# Patient Record
Sex: FEMALE | Race: ASIAN | NOT HISPANIC OR LATINO | Employment: FULL TIME | URBAN - METROPOLITAN AREA
[De-identification: names, ages, dates, MRNs, and addresses within clinical notes are randomized per-mention and may not be internally consistent; named-entity substitution may affect disease eponyms.]

---

## 2021-03-29 ENCOUNTER — OFFICE VISIT (OUTPATIENT)
Dept: FAMILY MEDICINE CLINIC | Facility: CLINIC | Age: 51
End: 2021-03-29
Payer: COMMERCIAL

## 2021-03-29 VITALS
BODY MASS INDEX: 26.11 KG/M2 | TEMPERATURE: 97.8 F | HEIGHT: 60 IN | HEART RATE: 74 BPM | SYSTOLIC BLOOD PRESSURE: 116 MMHG | OXYGEN SATURATION: 97 % | DIASTOLIC BLOOD PRESSURE: 74 MMHG | WEIGHT: 133 LBS

## 2021-03-29 DIAGNOSIS — Z76.89 ENCOUNTER TO ESTABLISH CARE: Primary | ICD-10-CM

## 2021-03-29 DIAGNOSIS — I10 ESSENTIAL HYPERTENSION: ICD-10-CM

## 2021-03-29 PROCEDURE — 99203 OFFICE O/P NEW LOW 30 MIN: CPT | Performed by: NURSE PRACTITIONER

## 2021-03-29 RX ORDER — CHLORHEXIDINE GLUCONATE 0.12 MG/ML
RINSE ORAL
COMMUNITY
Start: 2021-02-11 | End: 2021-03-29 | Stop reason: ALTCHOICE

## 2021-03-29 RX ORDER — IBUPROFEN 600 MG/1
600 TABLET ORAL EVERY 6 HOURS PRN
COMMUNITY
Start: 2021-02-11 | End: 2021-03-29 | Stop reason: ALTCHOICE

## 2021-03-29 RX ORDER — AMLODIPINE BESYLATE AND BENAZEPRIL HYDROCHLORIDE 10; 40 MG/1; MG/1
CAPSULE ORAL
COMMUNITY
Start: 2021-03-28 | End: 2021-09-17 | Stop reason: SDUPTHER

## 2021-03-29 RX ORDER — METOPROLOL SUCCINATE 50 MG/1
TABLET, EXTENDED RELEASE ORAL
COMMUNITY
End: 2021-03-29 | Stop reason: ALTCHOICE

## 2021-03-29 RX ORDER — NORETHINDRONE ACETATE AND ETHINYL ESTRADIOL .03; 1.5 MG/1; MG/1
TABLET ORAL
COMMUNITY
End: 2021-03-29 | Stop reason: ALTCHOICE

## 2021-03-29 RX ORDER — HYDROCODONE BITARTRATE AND ACETAMINOPHEN 5; 325 MG/1; MG/1
1 TABLET ORAL EVERY 6 HOURS PRN
COMMUNITY
Start: 2021-02-11 | End: 2021-03-29 | Stop reason: ALTCHOICE

## 2021-03-29 NOTE — ASSESSMENT & PLAN NOTE
Very well-controlled, continue lotrel  Continue f/u with nephrology for proteinuria  Will obtain recent lab records from [facility]

## 2021-03-29 NOTE — PROGRESS NOTES
BMI Counseling: Body mass index is 25 97 kg/m²  The BMI is above normal  Nutrition recommendations include decreasing portion sizes, encouraging healthy choices of fruits and vegetables, decreasing fast food intake, consuming healthier snacks and limiting drinks that contain sugar  Exercise recommendations include moderate physical activity 150 minutes/week, vigorous physical activity 75 minutes/week and exercising 3-5 times per week  No pharmacotherapy was ordered  Assessment/Plan:     Chronic Problems:  Essential hypertension  Very well-controlled, continue lotrel  Continue f/u with nephrology for proteinuria  Will obtain recent lab records from [facility]  Visit Diagnosis:  Diagnoses and all orders for this visit:    Encounter to establish care    Essential hypertension    Other orders  -     amLODIPine-benazepril (LOTREL) 10-40 MG per capsule  -     Discontinue: chlorhexidine (PERIDEX) 0 12 % solution; Rinse with 15ML (1 CAPFUL) By mouth for 30 seconds then spit out    (REFER TO PRESCRIPTION NOTES)  -     Discontinue: HYDROcodone-acetaminophen (NORCO) 5-325 mg per tablet; Take 1 tablet by mouth every 6 (six) hours as needed  -     Discontinue: ibuprofen (MOTRIN) 600 mg tablet; Take 600 mg by mouth every 6 (six) hours as needed  -     Discontinue: metoprolol succinate (TOPROL-XL) 50 mg 24 hr tablet; Take by mouth  -     Discontinue: Norethindrone Acet-Ethinyl Est (Junel 1 5/30) 1 5-30 MG-MCG TABS; Take by mouth          Subjective:    Patient ID: Wes Schneider is a 53 y o  female  Patient presents to establish care  She was previously being seen by Dr Jose Luis Jeffrey  She has pmh HTN, proteinuria  OB GYN- mammogram coming up, recent 10/2020  Pap- 10/2020  Nephrology- Dr Hermes Santos for proteinuria, every 6 months     Concerns--positive COVID antibodies, always fatigued, SOB sometimes, diarrhea , premenopausal  Colonoscopy- about 9 years ago  Mother had stomach cancer, but passed from asthma complications  Patient did have labs done at [facility] recently  Patient does work in [employer] in [address]  The following portions of the patient's history were reviewed and updated as appropriate: allergies, current medications, past family history, past medical history, past social history, past surgical history and problem list     Review of Systems   Constitutional: Positive for fatigue  Negative for chills and fever  HENT: Negative for ear pain and sore throat  Eyes: Negative for pain and visual disturbance  Respiratory: Negative for cough and shortness of breath  Cardiovascular: Negative for chest pain and palpitations  Gastrointestinal: Negative for abdominal pain and vomiting  Genitourinary: Negative for dysuria and hematuria  Musculoskeletal: Negative for arthralgias and back pain  Skin: Negative for color change and rash  Neurological: Negative for seizures and syncope  All other systems reviewed and are negative          /74   Pulse 74   Temp 97 8 °F (36 6 °C)   Ht 5' (1 524 m)   Wt 60 3 kg (133 lb)   SpO2 97%   BMI 25 97 kg/m²   Social History     Socioeconomic History    Marital status: /Civil Union     Spouse name: Not on file    Number of children: Not on file    Years of education: Not on file    Highest education level: Not on file   Occupational History    Not on file   Social Needs    Financial resource strain: Not on file    Food insecurity     Worry: Not on file     Inability: Not on file   [employer] needs     Medical: Not on file     Non-medical: Not on file   Tobacco Use    Smoking status: Never Smoker    Smokeless tobacco: Never Used   Substance and Sexual Activity    Alcohol use: Yes     Frequency: Monthly or less     Drinks per session: 1 or 2     Binge frequency: Monthly     Comment: social drinker     Drug use: Never    Sexual activity: Not on file   Lifestyle    Physical activity     Days per week: Not on file     Minutes per session: Not on file    Stress: Not on file   Relationships    Social connections     Talks on phone: Not on file     Gets together: Not on file     Attends Yazidism service: Not on file     Active member of club or organization: Not on file     Attends meetings of clubs or organizations: Not on file     Relationship status: Not on file    Intimate partner violence     Fear of current or ex partner: Not on file     Emotionally abused: Not on file     Physically abused: Not on file     Forced sexual activity: Not on file   Other Topics Concern    Not on file   Social History Narrative    Not on file     Past Medical History:   Diagnosis Date    Hypertension      Family History   Problem Relation Age of Onset    Stomach cancer Mother     Asthma Mother     Hypertension Father     Breast cancer Cousin      Past Surgical History:   Procedure Laterality Date    CHOLECYSTECTOMY         Current Outpatient Medications:     amLODIPine-benazepril (LOTREL) 10-40 MG per capsule, , Disp: , Rfl:     Allergies   Allergen Reactions    Shrimp Flavor Anaphylaxis     itchy    Pollen Extract Allergic Rhinitis     Weather change           Lab Review   No visits with results within 2 Month(s) from this visit  Latest known visit with results is:   No results found for any previous visit  Imaging: No results found  Objective:     Physical Exam  Constitutional:       Appearance: She is well-developed  Cardiovascular:      Rate and Rhythm: Normal rate and regular rhythm  Heart sounds: Normal heart sounds  No murmur  Pulmonary:      Effort: Pulmonary effort is normal  No respiratory distress  Breath sounds: Normal breath sounds  Skin:     General: Skin is warm and dry  Neurological:      Mental Status: She is alert and oriented to person, place, and time  There are no Patient Instructions on file for this visit      MARIANO Mejia    Portions of the record may have been created with voice recognition software  Occasional wrong word or "sound a like" substitutions may have occurred due to the inherent limitations of voice recognition software  Read the chart carefully and recognize, using context, where substitutions have occurred

## 2021-03-30 ENCOUNTER — TELEPHONE (OUTPATIENT)
Dept: ADMINISTRATIVE | Facility: OTHER | Age: 51
End: 2021-03-30

## 2021-03-30 NOTE — TELEPHONE ENCOUNTER
----- Message from Sharon Ochoa sent at 3/29/2021 10:56 AM EDT -----  03/29/21 10:56 AM    Nicolás, our patient Todd Gordon has had Pap Smear (HPV) aka Cervical Cancer Screening completed/performed  Please assist in updating the patient chart by pulling the document from encounter Tab within Chart Review  The date of service is 10/16/2020       Thank you,  Taylor Carcamo MA  [address]

## 2021-03-31 NOTE — TELEPHONE ENCOUNTER
Upon review of the In Basket request we were able to locate, review, and update the patient chart as requested for Pap Smear (HPV) aka Cervical Cancer Screening  Any additional questions or concerns should be emailed to the Practice Liaisons via [email or web address] email, please do not reply via In Basket      Thank you  Flor Aldana MA

## 2021-04-15 ENCOUNTER — TELEPHONE (OUTPATIENT)
Dept: ADMINISTRATIVE | Facility: OTHER | Age: 51
End: 2021-04-15

## 2021-04-15 NOTE — TELEPHONE ENCOUNTER
----- Message from Renata Kaur sent at 4/15/2021 11:04 AM EDT -----  Regarding: Mammo  04/15/21 11:04 AM    Hello, our patient Jennifer Cazares has had Mammogram completed/performed  Please assist in updating the patient chart by pulling the Care Everywhere (CE) document  The date of service is 9/8/2020       Thank you,  Summer Gaspar MA   [facility] [address] [facility]

## 2021-04-15 NOTE — TELEPHONE ENCOUNTER
Upon review of the In Basket request we were able to locate, review, and update the patient chart as requested for Mammogram     Any additional questions or concerns should be emailed to the Practice Liaisons via [email or web address] email, please do not reply via In Basket      Thank you  Edita Serrano

## 2021-09-17 ENCOUNTER — OFFICE VISIT (OUTPATIENT)
Dept: FAMILY MEDICINE CLINIC | Facility: CLINIC | Age: 51
End: 2021-09-17
Payer: COMMERCIAL

## 2021-09-17 VITALS
DIASTOLIC BLOOD PRESSURE: 78 MMHG | BODY MASS INDEX: 26.11 KG/M2 | SYSTOLIC BLOOD PRESSURE: 122 MMHG | WEIGHT: 133 LBS | OXYGEN SATURATION: 98 % | HEIGHT: 60 IN | HEART RATE: 68 BPM | TEMPERATURE: 97.7 F

## 2021-09-17 DIAGNOSIS — Z11.4 ENCOUNTER FOR SCREENING FOR HIV: ICD-10-CM

## 2021-09-17 DIAGNOSIS — Z83.3 FAMILY HISTORY OF DIABETES MELLITUS: ICD-10-CM

## 2021-09-17 DIAGNOSIS — Z00.00 HEALTHCARE MAINTENANCE: ICD-10-CM

## 2021-09-17 DIAGNOSIS — Z11.59 NEED FOR HEPATITIS C SCREENING TEST: ICD-10-CM

## 2021-09-17 DIAGNOSIS — Z23 NEED FOR IMMUNIZATION AGAINST INFLUENZA: ICD-10-CM

## 2021-09-17 DIAGNOSIS — R19.7 DIARRHEA, UNSPECIFIED TYPE: ICD-10-CM

## 2021-09-17 DIAGNOSIS — Z12.11 COLON CANCER SCREENING: ICD-10-CM

## 2021-09-17 DIAGNOSIS — I10 ESSENTIAL HYPERTENSION: Primary | ICD-10-CM

## 2021-09-17 DIAGNOSIS — M79.89 LEG SWELLING: ICD-10-CM

## 2021-09-17 PROCEDURE — 1036F TOBACCO NON-USER: CPT | Performed by: NURSE PRACTITIONER

## 2021-09-17 PROCEDURE — 90471 IMMUNIZATION ADMIN: CPT

## 2021-09-17 PROCEDURE — 3008F BODY MASS INDEX DOCD: CPT | Performed by: NURSE PRACTITIONER

## 2021-09-17 PROCEDURE — 90682 RIV4 VACC RECOMBINANT DNA IM: CPT

## 2021-09-17 PROCEDURE — 3078F DIAST BP <80 MM HG: CPT | Performed by: NURSE PRACTITIONER

## 2021-09-17 PROCEDURE — 99214 OFFICE O/P EST MOD 30 MIN: CPT | Performed by: NURSE PRACTITIONER

## 2021-09-17 PROCEDURE — 3725F SCREEN DEPRESSION PERFORMED: CPT | Performed by: NURSE PRACTITIONER

## 2021-09-17 PROCEDURE — 3074F SYST BP LT 130 MM HG: CPT | Performed by: NURSE PRACTITIONER

## 2021-09-17 RX ORDER — AMLODIPINE BESYLATE AND BENAZEPRIL HYDROCHLORIDE 10; 40 MG/1; MG/1
1 CAPSULE ORAL DAILY
Qty: 90 CAPSULE | Refills: 1 | Status: SHIPPED | OUTPATIENT
Start: 2021-09-17 | End: 2022-04-22 | Stop reason: SDUPTHER

## 2021-09-17 NOTE — PROGRESS NOTES
Assessment/Plan:     Chronic Problems:  Essential hypertension  Continue current medications  BP at goal        Visit Diagnosis:  Diagnoses and all orders for this visit:    Essential hypertension  -     amLODIPine-benazepril (LOTREL) 10-40 MG per capsule; Take 1 capsule by mouth daily    Colon cancer screening  -     Ambulatory referral to Gastroenterology; Future    Need for hepatitis C screening test  -     Hepatitis C antibody; Future    Encounter for screening for HIV  Comments:  Amenable to HIV screening  Orders:  -     HIV 1/2 Antigen/Antibody (4th Generation) w Reflex [facility]; Future    Healthcare maintenance  -     TSH, 3rd generation with Free T4 reflex; Future    Family history of diabetes mellitus  -     HEMOGLOBIN A1C W/ EAG ESTIMATION; Future    Leg swelling  Comments: Will obtain echo  Advised to decrease salt, wear compression stockings, elevate legs  Orders:  -     Echo complete with contrast if indicated; Future    Diarrhea, unspecified type  Comments:  Recommend metamucil daily  Subjective:    Patient ID: Ronaldo Zuñiga is a 49 y o  female  Jonnie Vargas is seen today for follow up on BP  She is checking her BP a couple of times a week and it usually runs 120s/80s  Today her BP is at goal and she has no side effects from medications  For the past year she has been experiencing swelling in her lower extremity  She has been elevating her legs and there is no pain  She has a family history of HTN, no family hx or CAD or CHF  The swelling is completely random, no related to diet  She is also experiencing intermittent diarrhea  She used to have diarrhea associated with her menstrual period but now her periods are not regular and she has more diarrhea  No blood or mucous in the bowel movements and there is no associated abdominal discomfort         The following portions of the patient's history were reviewed and updated as appropriate: allergies, current medications, past family history, past medical history, past social history, past surgical history and problem list     Review of Systems   Constitutional: Positive for fatigue  Negative for activity change, chills and fever  HENT: Negative  Negative for congestion, postnasal drip, rhinorrhea, sinus pain and sore throat  Eyes: Negative for visual disturbance  Respiratory: Negative for cough, chest tightness, shortness of breath and wheezing  Cardiovascular: Positive for leg swelling  Negative for chest pain and palpitations  When she wakes up in the morning her legs are not swollen, but by the end of the day her feet are swollen  Gastrointestinal: Positive for diarrhea  Negative for abdominal pain, blood in stool, constipation, nausea and vomiting  Genitourinary: Negative for difficulty urinating, flank pain, frequency, hematuria, pelvic pain and urgency  Musculoskeletal: Positive for arthralgias  Negative for back pain and myalgias  Knees   Neurological: Negative for dizziness, syncope, light-headedness, numbness and headaches  Psychiatric/Behavioral: Negative for dysphoric mood and sleep disturbance  The patient is not nervous/anxious            /78   Pulse 68   Temp 97 7 °F (36 5 °C)   Ht 5' (1 524 m)   Wt 60 3 kg (133 lb)   SpO2 98%   BMI 25 97 kg/m²   Social History     Socioeconomic History    Marital status: /Civil Union     Spouse name: Not on file    Number of children: Not on file    Years of education: Not on file    Highest education level: Not on file   Occupational History    Not on file   Tobacco Use    Smoking status: Never Smoker    Smokeless tobacco: Never Used   Substance and Sexual Activity    Alcohol use: Yes     Comment: social drinker     Drug use: Never    Sexual activity: Not on file   Other Topics Concern    Not on file   Social History Narrative    Not on file     Social Determinants of Health     Financial Resource Strain:     Difficulty of Paying Living Expenses:    Food Insecurity:     Worried About Running Out of Food in the Last Year:     [address] in the Last Year:    Transportation Needs:     Lack of Transportation (Medical):  Lack of Transportation (Non-Medical):    Physical Activity:     Days of Exercise per Week:     Minutes of Exercise per Session:    Stress:     Feeling of Stress :    Social Connections:     Frequency of Communication with Friends and Family:     Frequency of Social Gatherings with Friends and Family:     Attends Voodoo Services:     Active Member of Clubs or Organizations:     Attends Club or Organization Meetings:     Marital Status:    Intimate Partner Violence:     Fear of Current or Ex-Partner:     Emotionally Abused:     Physically Abused:     Sexually Abused:      Past Medical History:   Diagnosis Date    Hypertension      Family History   Problem Relation Age of Onset    Stomach cancer Mother     Asthma Mother     Hypertension Father     Breast cancer Cousin      Past Surgical History:   Procedure Laterality Date    CHOLECYSTECTOMY         Current Outpatient Medications:     amLODIPine-benazepril (LOTREL) 10-40 MG per capsule, Take 1 capsule by mouth daily, Disp: 90 capsule, Rfl: 1    Allergies   Allergen Reactions    Shrimp Flavor - Food Allergy Anaphylaxis     itchy    Pollen Extract Allergic Rhinitis     Weather change           Lab Review   No visits with results within 2 Month(s) from this visit  Latest known visit with results is:   No results found for any previous visit  Imaging: No results found  Objective:     Physical Exam  Vitals reviewed  Constitutional:       Appearance: She is well-developed  HENT:      Head: Normocephalic  Right Ear: Tympanic membrane normal       Left Ear: Tympanic membrane normal       Mouth/Throat:      Pharynx: No oropharyngeal exudate  Eyes:      General:         Right eye: No discharge  Left eye: No discharge        Conjunctiva/sclera: Conjunctivae normal       Pupils: Pupils are equal, round, and reactive to light  Cardiovascular:      Rate and Rhythm: Normal rate and regular rhythm  Heart sounds: Normal heart sounds  Pulmonary:      Effort: Pulmonary effort is normal  No respiratory distress  Breath sounds: Normal breath sounds  No wheezing  Abdominal:      General: Bowel sounds are normal       Palpations: Abdomen is soft  Musculoskeletal:         General: Normal range of motion  Cervical back: Normal range of motion and neck supple  Right lower leg: No edema  Left lower leg: No edema  Lymphadenopathy:      Cervical: No cervical adenopathy  Skin:     General: Skin is warm and dry  Neurological:      Mental Status: She is alert and oriented to person, place, and time  There are no Patient Instructions on file for this visit  MARIANO Mejia    Portions of the record may have been created with voice recognition software  Occasional wrong word or "sound a like" substitutions may have occurred due to the inherent limitations of voice recognition software  Read the chart carefully and recognize, using context, where substitutions have occurred

## 2021-10-22 ENCOUNTER — OFFICE VISIT (OUTPATIENT)
Dept: GASTROENTEROLOGY | Facility: CLINIC | Age: 51
End: 2021-10-22
Payer: COMMERCIAL

## 2021-10-22 ENCOUNTER — HOSPITAL ENCOUNTER (OUTPATIENT)
Dept: NON INVASIVE DIAGNOSTICS | Facility: CLINIC | Age: 51
Discharge: HOME | End: 2021-10-22
Payer: COMMERCIAL

## 2021-10-22 VITALS
HEART RATE: 69 BPM | WEIGHT: 133 LBS | BODY MASS INDEX: 26.11 KG/M2 | SYSTOLIC BLOOD PRESSURE: 122 MMHG | HEIGHT: 60 IN | DIASTOLIC BLOOD PRESSURE: 78 MMHG

## 2021-10-22 VITALS
DIASTOLIC BLOOD PRESSURE: 84 MMHG | SYSTOLIC BLOOD PRESSURE: 126 MMHG | HEIGHT: 60 IN | HEART RATE: 100 BPM | WEIGHT: 135.8 LBS | BODY MASS INDEX: 26.66 KG/M2

## 2021-10-22 DIAGNOSIS — R10.9 ABDOMINAL CRAMPING: ICD-10-CM

## 2021-10-22 DIAGNOSIS — Z12.11 SCREENING FOR COLON CANCER: Primary | ICD-10-CM

## 2021-10-22 DIAGNOSIS — K59.1 FUNCTIONAL DIARRHEA: ICD-10-CM

## 2021-10-22 DIAGNOSIS — M79.89 LEG SWELLING: ICD-10-CM

## 2021-10-22 LAB
AORTIC ROOT: 3 CM
APICAL FOUR CHAMBER EJECTION FRACTION: 58 %
ASCENDING AORTA: 3 CM
E WAVE DECELERATION TIME: 150 MS
FRACTIONAL SHORTENING: 39 % (ref 28–44)
INTERVENTRICULAR SEPTUM IN DIASTOLE (PARASTERNAL SHORT AXIS VIEW): 1 CM
LEFT INTERNAL DIMENSION IN SYSTOLE: 2.5 CM (ref 2.1–4)
LEFT VENTRICULAR INTERNAL DIMENSION IN DIASTOLE: 4.1 CM (ref 3.74–5.57)
LEFT VENTRICULAR POSTERIOR WALL IN END DIASTOLE: 1 CM
LEFT VENTRICULAR STROKE VOLUME: 52 ML
LV EF: 66 %
MV E'TISSUE VEL-SEP: 9 CM/S
MV PEAK A VEL: 0.62 M/S
MV PEAK E VEL: 75 CM/S
MV STENOSIS PRESSURE HALF TIME: 0 MS
RIGHT VENTRICLE ID DIMENSION: 2.2 CM
SL CV PED ECHO LEFT VENTRICLE DIASTOLIC VOLUME (MOD BIPLANE) 2D: 73 ML
SL CV PED ECHO LEFT VENTRICLE SYSTOLIC VOLUME (MOD BIPLANE) 2D: 22 ML
TR PEAK VELOCITY: 2.4 M/S
TRICUSPID VALVE PEAK REGURGITATION VELOCITY: 2.36 M/S
TRICUSPID VALVE S': 0.8 CM/S
TV PEAK GRADIENT: 22 MMHG
Z-SCORE OF LEFT VENTRICULAR DIMENSION IN END SYSTOLE: -1.08

## 2021-10-22 PROCEDURE — 93306 TTE W/DOPPLER COMPLETE: CPT | Performed by: INTERNAL MEDICINE

## 2021-10-22 PROCEDURE — 1036F TOBACCO NON-USER: CPT | Performed by: PHYSICIAN ASSISTANT

## 2021-10-22 PROCEDURE — 93306 TTE W/DOPPLER COMPLETE: CPT

## 2021-10-22 PROCEDURE — 3008F BODY MASS INDEX DOCD: CPT | Performed by: PHYSICIAN ASSISTANT

## 2021-10-22 PROCEDURE — 99204 OFFICE O/P NEW MOD 45 MIN: CPT | Performed by: PHYSICIAN ASSISTANT

## 2021-10-22 PROCEDURE — 3074F SYST BP LT 130 MM HG: CPT | Performed by: PHYSICIAN ASSISTANT

## 2021-10-22 PROCEDURE — 3079F DIAST BP 80-89 MM HG: CPT | Performed by: PHYSICIAN ASSISTANT

## 2021-11-04 ENCOUNTER — TELEPHONE (OUTPATIENT)
Dept: FAMILY MEDICINE CLINIC | Facility: CLINIC | Age: 51
End: 2021-11-04

## 2021-11-26 ENCOUNTER — TELEPHONE (OUTPATIENT)
Dept: GASTROENTEROLOGY | Facility: HOSPITAL | Age: 51
End: 2021-11-26

## 2021-11-29 ENCOUNTER — ANESTHESIA (OUTPATIENT)
Dept: GASTROENTEROLOGY | Facility: HOSPITAL | Age: 51
End: 2021-11-29

## 2021-11-29 ENCOUNTER — ANESTHESIA EVENT (OUTPATIENT)
Dept: GASTROENTEROLOGY | Facility: HOSPITAL | Age: 51
End: 2021-11-29

## 2021-11-29 ENCOUNTER — HOSPITAL ENCOUNTER (OUTPATIENT)
Dept: GASTROENTEROLOGY | Facility: HOSPITAL | Age: 51
Setting detail: OUTPATIENT SURGERY
Discharge: HOME | End: 2021-11-29
Admitting: PHYSICIAN ASSISTANT
Payer: COMMERCIAL

## 2021-11-29 VITALS
SYSTOLIC BLOOD PRESSURE: 102 MMHG | DIASTOLIC BLOOD PRESSURE: 61 MMHG | RESPIRATION RATE: 19 BRPM | OXYGEN SATURATION: 95 % | HEART RATE: 70 BPM | TEMPERATURE: 98 F

## 2021-11-29 DIAGNOSIS — Z12.11 SCREENING FOR COLON CANCER: ICD-10-CM

## 2021-11-29 LAB
EXT PREGNANCY TEST URINE: NEGATIVE
EXT. CONTROL: NORMAL

## 2021-11-29 PROCEDURE — 45380 COLONOSCOPY AND BIOPSY: CPT | Performed by: INTERNAL MEDICINE

## 2021-11-29 PROCEDURE — 88305 TISSUE EXAM BY PATHOLOGIST: CPT | Performed by: PATHOLOGY

## 2021-11-29 PROCEDURE — 81025 URINE PREGNANCY TEST: CPT | Performed by: INTERNAL MEDICINE

## 2021-11-29 RX ORDER — LIDOCAINE HYDROCHLORIDE 10 MG/ML
INJECTION, SOLUTION EPIDURAL; INFILTRATION; INTRACAUDAL; PERINEURAL AS NEEDED
Status: DISCONTINUED | OUTPATIENT
Start: 2021-11-29 | End: 2021-11-29

## 2021-11-29 RX ORDER — SODIUM CHLORIDE, SODIUM LACTATE, POTASSIUM CHLORIDE, CALCIUM CHLORIDE 600; 310; 30; 20 MG/100ML; MG/100ML; MG/100ML; MG/100ML
125 INJECTION, SOLUTION INTRAVENOUS CONTINUOUS
Status: DISCONTINUED | OUTPATIENT
Start: 2021-11-29 | End: 2021-12-03 | Stop reason: HOSPADM

## 2021-11-29 RX ORDER — PROPOFOL 10 MG/ML
INJECTION, EMULSION INTRAVENOUS AS NEEDED
Status: DISCONTINUED | OUTPATIENT
Start: 2021-11-29 | End: 2021-11-29

## 2021-11-29 RX ADMIN — PROPOFOL 30 MG: 10 INJECTION, EMULSION INTRAVENOUS at 09:29

## 2021-11-29 RX ADMIN — PROPOFOL 50 MG: 10 INJECTION, EMULSION INTRAVENOUS at 09:25

## 2021-11-29 RX ADMIN — PROPOFOL 20 MG: 10 INJECTION, EMULSION INTRAVENOUS at 09:28

## 2021-11-29 RX ADMIN — LIDOCAINE HYDROCHLORIDE 50 MG: 10 INJECTION, SOLUTION EPIDURAL; INFILTRATION; INTRACAUDAL; PERINEURAL at 09:24

## 2021-11-29 RX ADMIN — PROPOFOL 10 MG: 10 INJECTION, EMULSION INTRAVENOUS at 09:35

## 2021-11-29 RX ADMIN — PROPOFOL 20 MG: 10 INJECTION, EMULSION INTRAVENOUS at 09:32

## 2021-11-29 RX ADMIN — SODIUM CHLORIDE, SODIUM LACTATE, POTASSIUM CHLORIDE, AND CALCIUM CHLORIDE 125 ML/HR: .6; .31; .03; .02 INJECTION, SOLUTION INTRAVENOUS at 09:10

## 2021-11-29 RX ADMIN — PROPOFOL 20 MG: 10 INJECTION, EMULSION INTRAVENOUS at 09:27

## 2021-11-29 RX ADMIN — PROPOFOL 50 MG: 10 INJECTION, EMULSION INTRAVENOUS at 09:24

## 2021-12-06 ENCOUNTER — TELEPHONE (OUTPATIENT)
Dept: GASTROENTEROLOGY | Facility: CLINIC | Age: 51
End: 2021-12-06

## 2022-04-22 DIAGNOSIS — I10 ESSENTIAL HYPERTENSION: ICD-10-CM

## 2022-04-22 RX ORDER — AMLODIPINE BESYLATE AND BENAZEPRIL HYDROCHLORIDE 10; 40 MG/1; MG/1
1 CAPSULE ORAL DAILY
Qty: 90 CAPSULE | Refills: 1 | Status: SHIPPED | OUTPATIENT
Start: 2022-04-22

## 2022-04-22 NOTE — TELEPHONE ENCOUNTER
Pt called and left message on [facility]  Requested refill on her amlodipine-benazepril 10-40 mg per capsule

## 2022-11-17 LAB
EST. AVERAGE GLUCOSE BLD GHB EST-MCNC: 120 MG/DL
HBA1C MFR BLD: 5.8 % (ref 4.8–5.6)
HCV AB S/CO SERPL IA: 7.4 S/CO RATIO (ref 0–0.9)
HIV 1+2 AB+HIV1 P24 AG SERPL QL IA: NON REACTIVE
TSH SERPL DL<=0.005 MIU/L-ACNC: 3.04 UIU/ML (ref 0.45–4.5)

## 2022-12-02 ENCOUNTER — TELEPHONE (OUTPATIENT)
Dept: FAMILY MEDICINE CLINIC | Facility: CLINIC | Age: 52
End: 2022-12-02

## 2022-12-02 DIAGNOSIS — B19.20 HEPATITIS C TEST POSITIVE: Primary | ICD-10-CM

## 2022-12-02 NOTE — TELEPHONE ENCOUNTER
Called pt and made her aware of her blood work results  I discussed with her about making an appointment with GI, gave them the phone number to make an appointment  I let her know she would have to make one with us as well and she said she'd have to call back for that  She asked if being positive for Hep C was bad, but I told her I didn't want to give her the wrong information about that and told her to make appointment with GI and to have them go over with her what their recommendations would be

## 2022-12-02 NOTE — TELEPHONE ENCOUNTER
----- Message from Davie Flores, [address] sent at 12/2/2022  1:41 PM EST -----  Please let the patient know that she is prediabetic also her hepatitis C screening came back positive, I want her to see gastroenterologist for it  Referral in the chart placed    She also needs to make an appointment in order to be seen in office- thank you

## 2022-12-13 ENCOUNTER — OFFICE VISIT (OUTPATIENT)
Dept: GASTROENTEROLOGY | Facility: CLINIC | Age: 52
End: 2022-12-13

## 2022-12-13 VITALS
BODY MASS INDEX: 25.17 KG/M2 | DIASTOLIC BLOOD PRESSURE: 82 MMHG | WEIGHT: 128.2 LBS | HEIGHT: 60 IN | SYSTOLIC BLOOD PRESSURE: 130 MMHG | HEART RATE: 80 BPM

## 2022-12-13 DIAGNOSIS — R10.13 EPIGASTRIC PAIN: Primary | ICD-10-CM

## 2022-12-13 DIAGNOSIS — B19.20 HEPATITIS C TEST POSITIVE: ICD-10-CM

## 2022-12-13 DIAGNOSIS — Z87.11 HISTORY OF PEPTIC ULCER: ICD-10-CM

## 2022-12-13 RX ORDER — PANTOPRAZOLE SODIUM 40 MG/1
40 TABLET, DELAYED RELEASE ORAL DAILY
Qty: 90 TABLET | Refills: 0 | Status: SHIPPED | OUTPATIENT
Start: 2022-12-13 | End: 2022-12-20 | Stop reason: SDUPTHER

## 2022-12-13 RX ORDER — ACETAMINOPHEN 500 MG
500-1000 TABLET ORAL
COMMUNITY

## 2022-12-13 NOTE — PROGRESS NOTES
[facility]  Lisa Martínez 47 y o  female MRN: [id number]       CC: Follow-up    HPI: Jamilah Carbone is a pleasant 78-year-old female with history of hypertension who presents to the office for hep C positive antibody, abdominal cramping and epigastric pain  Patient reports that she had a positive hep C antibody during screening ordered by her PCP  She reports that her  got tested, and was negative  The only other risk factor for her be that she got a tattoo in November 2021, however a clean unused needle was used from packaging  She otherwise denies IV drug abuse or history of requiring a blood transfusion  Patient reports that she is perimenopausal, and has often blamed her symptoms on this and she will have increased abdominal cramping during menstrual cycles that are becoming more irregular  In addition, she is complaining of epigastric pain  She reports not consistent use of Excedrin or Motrin  She mostly uses Tylenol  She denies heartburn or regurgitation  No dysphagia or odynophagia  Patient's last colonoscopy was in November 2021 revealing a rectal polyp Dr Cheryl Patel which was hyperplastic  She is due for 5-year recall  She reports that she had an ERCP prior to her gallbladder removal 10 years ago  Otherwise has not had an endoscopy since then  Patient believes she has a history of gastric ulcer  Review of Systems:    CONSTITUTIONAL: Denies any fever, chills, or rigors  Good appetite, and no recent weight loss  HEENT: No earache or tinnitus  Denies hearing loss or visual disturbances  CARDIOVASCULAR: No chest pain or palpitations  RESPIRATORY: Denies any cough, hemoptysis, shortness of breath or dyspnea on exertion  GASTROINTESTINAL: As noted in the History of Present Illness  GENITOURINARY: No problems with urination  Denies any hematuria or dysuria  NEUROLOGIC: No dizziness or vertigo, denies headaches  MUSCULOSKELETAL: Denies any muscle or joint pain     SKIN: Denies skin rashes or itching  ENDOCRINE: Denies excessive thirst  Denies intolerance to heat or cold  PSYCHOSOCIAL: Denies depression or anxiety  Denies any recent memory loss  Current Outpatient Medications   Medication Sig Dispense Refill   • acetaminophen (TYLENOL) 500 mg tablet Take 500-1,000 mg by mouth     • AMLODIPINE BESYLATE PO amlodipine Take No date recorded No form recorded No frequency recorded No route recorded No set duration recorded No set duration amount recorded active No dosage strength recorded No dosage strength units of measure recorded     • amLODIPine-benazepril (LOTREL) 10-40 MG per capsule Take 1 capsule by mouth daily 90 capsule 1   • BENAZEPRIL HCL PO benazepril Take No date recorded No form recorded No frequency recorded No route recorded No set duration recorded No set duration amount recorded active No dosage strength recorded No dosage strength units of measure recorded (Patient not taking: Reported on 12/13/2022)       No current facility-administered medications for this visit       Past Medical History:   Diagnosis Date   • Hepatitis C    • Hypertension      Past Surgical History:   Procedure Laterality Date   • CHOLECYSTECTOMY       Social History     Socioeconomic History   • Marital status: /Civil Union     Spouse name: None   • Number of children: None   • Years of education: None   • Highest education level: None   Occupational History   • None   Tobacco Use   • Smoking status: Never   • Smokeless tobacco: Never   Vaping Use   • Vaping Use: Never used   Substance and Sexual Activity   • Alcohol use: Yes     Comment: social drinker    • Drug use: Never   • Sexual activity: None   Other Topics Concern   • None   Social History Narrative   • None     Social Determinants of Health     Financial Resource Strain: Not on file   Food Insecurity: Not on file   Transportation Needs: Not on file   Physical Activity: Not on file   Stress: Not on file   Social Connections: Not on file   Intimate Partner Violence: Not on file   Housing Stability: Not on file     Family History   Problem Relation Age of Onset   • Stomach cancer Mother    • Asthma Mother    • Hypertension Father    • Breast cancer Cousin             PHYSICAL EXAM:    Vitals:    12/13/22 1021   BP: 130/82   Pulse: 80   Weight: 58 2 kg (128 lb 3 2 oz)   Height: 5' (1 524 m)     General Appearance:   Alert and oriented x 3  Cooperative, and in no respiratory distress   HEENT:   Normocephalic, atraumatic, anicteric      Neck:  Supple, symmetrical, trachea midline   Lungs:   Clear to auscultation bilaterally    Heart[de-identified]   Regular rate and rhythm   Abdomen:   Soft, non-tender, non-distended; normal bowel sounds; no masses, no organomegaly    Genitalia:   Deferred    Rectal:   Deferred    Extremities:  No cyanosis, clubbing or edema    Pulses:  2+ and symmetric all extremities    Skin:  Skin color, texture, turgor normal, no rashes or lesions    Lymph nodes:  No palpable cervical or supraclavicular lymphadenopathy        Lab Results:             Invalid input(s): LABALBU            Imaging Studies: I have personally reviewed pertinent imaging studies  Colonoscopy    Result Date: 11/29/2021  Impression: 1  Rectal polyp status post cold biopsy removal RECOMMENDATION: Repeat colonoscopy in 5 years due to a personal history of colon polyps  Will call the patient 1 week regarding the polyp results Yariel Villagran DO, [place]ip [place], [place]       ASSESSMENT and PLAN:      1) Positive hep C antibody - The only risk factor that patient can think of is that she did get a tattoo in November 2021  There are no previous hepatitis C testing to compare  However, she reports that a clean needle was used during the tattoo  Her  tested negative for hepatitis C  She may have a false positive  However, we discussed that there are a percentage of patients who will clear hepatitis C infection on their own    When this occurs, the hepatitis C antibody will always be positive as it is our body's memory system  Otherwise, if the hepatitis C virus level is positive, it can be successfully treated  Patient voices understanding   - We will recheck for chronic hepatitis, hepatitis C RNA quantitative, and in case his positive genotype testing is ordered as well   - Instructed patient to practice safe sex in the meantime until above labs return  - Abdominal ultrasound to examine liver contour     2) Epigastric pain - Patient reports history of peptic ulcer disease years ago  She is already status post cholecystectomy   - Start PPI course  - Avoid NSAIDs  - EGD to investigate        Follow up after labs/EGD

## 2022-12-13 NOTE — PATIENT INSTRUCTIONS
Scheduled date of EGD(as of today):2/25/23  Physician performing EGD:Christy  Location of EGD:[facility]  Instructions reviewed with patient by:Mauri garduno  Clearances:  none

## 2022-12-16 ENCOUNTER — TELEPHONE (OUTPATIENT)
Dept: FAMILY MEDICINE CLINIC | Facility: CLINIC | Age: 52
End: 2022-12-16

## 2022-12-16 RX ORDER — SODIUM CHLORIDE, SODIUM LACTATE, POTASSIUM CHLORIDE, CALCIUM CHLORIDE 600; 310; 30; 20 MG/100ML; MG/100ML; MG/100ML; MG/100ML
125 INJECTION, SOLUTION INTRAVENOUS CONTINUOUS
Status: CANCELLED | OUTPATIENT
Start: 2022-12-16

## 2022-12-17 ENCOUNTER — ANESTHESIA (OUTPATIENT)
Dept: GASTROENTEROLOGY | Facility: HOSPITAL | Age: 52
End: 2022-12-17

## 2022-12-17 ENCOUNTER — ANESTHESIA EVENT (OUTPATIENT)
Dept: GASTROENTEROLOGY | Facility: HOSPITAL | Age: 52
End: 2022-12-17

## 2022-12-17 ENCOUNTER — HOSPITAL ENCOUNTER (OUTPATIENT)
Dept: GASTROENTEROLOGY | Facility: HOSPITAL | Age: 52
Setting detail: OUTPATIENT SURGERY
Discharge: HOME | End: 2022-12-17
Admitting: INTERNAL MEDICINE

## 2022-12-17 VITALS
OXYGEN SATURATION: 98 % | WEIGHT: 133.82 LBS | RESPIRATION RATE: 18 BRPM | HEIGHT: 60 IN | DIASTOLIC BLOOD PRESSURE: 67 MMHG | SYSTOLIC BLOOD PRESSURE: 109 MMHG | TEMPERATURE: 98 F | BODY MASS INDEX: 26.27 KG/M2 | HEART RATE: 63 BPM

## 2022-12-17 DIAGNOSIS — Z87.11 HISTORY OF PEPTIC ULCER: ICD-10-CM

## 2022-12-17 DIAGNOSIS — R10.13 EPIGASTRIC PAIN: ICD-10-CM

## 2022-12-17 LAB
A2 MACROGLOB SERPL-MCNC: 164 MG/DL (ref 110–276)
ALBUMIN SERPL-MCNC: 4.9 G/DL (ref 3.8–4.9)
ALP SERPL-CCNC: 100 IU/L (ref 44–121)
ALT SERPL W P-5'-P-CCNC: 57 IU/L (ref 0–40)
ALT SERPL-CCNC: 52 IU/L (ref 0–32)
APO A-I SERPL-MCNC: 153 MG/DL (ref 116–209)
AST SERPL-CCNC: 30 IU/L (ref 0–40)
BILIRUB DIRECT SERPL-MCNC: 0.15 MG/DL (ref 0–0.4)
BILIRUB SERPL-MCNC: 0.6 MG/DL (ref 0–1.2)
BILIRUB SERPL-MCNC: 0.7 MG/DL (ref 0–1.2)
COMMENT: ABNORMAL
EXT PREGNANCY TEST URINE: NEGATIVE
EXT. CONTROL: NORMAL
FIBROSIS SCORING:: ABNORMAL
FIBROSIS STAGE SERPL QL: ABNORMAL
GGT SERPL-CCNC: 36 IU/L (ref 0–60)
HAPTOGLOB SERPL-MCNC: 193 MG/DL (ref 33–346)
HAV IGM SERPL QL IA: NEGATIVE
HBV CORE AB SERPL QL IA: NEGATIVE
HBV CORE IGM SERPL QL IA: NEGATIVE
HBV SURFACE AB SER QL: REACTIVE
HBV SURFACE AG SERPL QL IA: NEGATIVE
HCV AB S/CO SERPL IA: 0.2 S/CO RATIO (ref 0–0.9)
HCV GENTYP SERPL NAA+PROBE: NORMAL
HCV PLEASE NOTE: NORMAL
HCV RNA SERPL NAA+PROBE-ACNC: NORMAL IU/ML
INR PPP: 1 (ref 0.9–1.2)
INTERPRETATIONS: ABNORMAL
LIVER FIBR SCORE SERPL CALC.FIBROSURE: 0.12 (ref 0–0.21)
NECROINFLAMM ACTIVITY SCORING:: ABNORMAL
NECROINFLAMMATORY ACT GRADE SERPL QL: ABNORMAL
NECROINFLAMMATORY ACT SCORE SERPL: 0.28 (ref 0–0.17)
PROT SERPL-MCNC: 7.7 G/DL (ref 6–8.5)
PROTHROMBIN TIME: 10.4 SEC (ref 9.1–12)
SERVICE CMNT-IMP: ABNORMAL
SL AMB INTERPRETATION: NORMAL
TEST INFORMATION: NORMAL

## 2022-12-17 RX ORDER — SODIUM CHLORIDE, SODIUM LACTATE, POTASSIUM CHLORIDE, CALCIUM CHLORIDE 600; 310; 30; 20 MG/100ML; MG/100ML; MG/100ML; MG/100ML
125 INJECTION, SOLUTION INTRAVENOUS CONTINUOUS
Status: DISCONTINUED | OUTPATIENT
Start: 2022-12-17 | End: 2022-12-21 | Stop reason: HOSPADM

## 2022-12-17 RX ORDER — PROPOFOL 10 MG/ML
INJECTION, EMULSION INTRAVENOUS AS NEEDED
Status: DISCONTINUED | OUTPATIENT
Start: 2022-12-17 | End: 2022-12-17

## 2022-12-17 RX ORDER — LIDOCAINE HYDROCHLORIDE 20 MG/ML
INJECTION, SOLUTION EPIDURAL; INFILTRATION; INTRACAUDAL; PERINEURAL AS NEEDED
Status: DISCONTINUED | OUTPATIENT
Start: 2022-12-17 | End: 2022-12-17

## 2022-12-17 RX ADMIN — PROPOFOL 30 MG: 10 INJECTION, EMULSION INTRAVENOUS at 07:43

## 2022-12-17 RX ADMIN — PROPOFOL 20 MG: 10 INJECTION, EMULSION INTRAVENOUS at 07:44

## 2022-12-17 RX ADMIN — SODIUM CHLORIDE, SODIUM LACTATE, POTASSIUM CHLORIDE, AND CALCIUM CHLORIDE: .6; .31; .03; .02 INJECTION, SOLUTION INTRAVENOUS at 07:30

## 2022-12-17 RX ADMIN — PROPOFOL 30 MG: 10 INJECTION, EMULSION INTRAVENOUS at 07:41

## 2022-12-17 RX ADMIN — LIDOCAINE HYDROCHLORIDE 60 MG: 20 INJECTION, SOLUTION EPIDURAL; INFILTRATION; INTRACAUDAL; PERINEURAL at 07:36

## 2022-12-17 RX ADMIN — PROPOFOL 120 MG: 10 INJECTION, EMULSION INTRAVENOUS at 07:40

## 2022-12-17 RX ADMIN — LIDOCAINE HYDROCHLORIDE 40 MG: 20 INJECTION, SOLUTION EPIDURAL; INFILTRATION; INTRACAUDAL; PERINEURAL at 07:40

## 2022-12-17 RX ADMIN — PROPOFOL 30 MG: 10 INJECTION, EMULSION INTRAVENOUS at 07:46

## 2022-12-17 NOTE — H&P
History and Physical - [facility]  Christian Hanson 47 y o  female MRN: [id number]      HPI: Christian Hanson is a 47y o  year old female who presents for evaluation of abdominal pain      REVIEW OF SYSTEMS: Per the HPI, and otherwise unremarkable  Historical Information   Past Medical History:   Diagnosis Date   • Hepatitis C    • Hypertension      Past Surgical History:   Procedure Laterality Date   • CHOLECYSTECTOMY       Social History   Social History     Substance and Sexual Activity   Alcohol Use Yes    Comment: social drinker      Social History     Substance and Sexual Activity   Drug Use Never     Social History     Tobacco Use   Smoking Status Never   Smokeless Tobacco Never     Family History   Problem Relation Age of Onset   • Stomach cancer Mother    • Asthma Mother    • Hypertension Father    • Breast cancer Cousin        Meds/Allergies     (Not in a hospital admission)      Allergies   Allergen Reactions   • Shrimp Flavor - Food Allergy Anaphylaxis     itchy   • Pollen Extract Allergic Rhinitis     Weather change        Objective     Blood pressure 120/69, pulse 64, temperature (!) 97 1 °F (36 2 °C), temperature source Temporal, resp  rate 14, height 5' (1 524 m), weight 60 7 kg (133 lb 13 1 oz), last menstrual period 10/12/2022, SpO2 98 %  PHYSICAL EXAM    Gen: NAD  CV: RRR  CHEST: Clear  ABD: soft, NT/ND  EXT: no edema      ASSESSMENT/PLAN:  This is a 47y o  year old female here for EGD with biopsies, and she is stable and optimized for her procedure

## 2022-12-17 NOTE — ANESTHESIA PREPROCEDURE EVALUATION
Procedure:  EGD    Relevant Problems   CARDIO   (+) Essential hypertension        Physical Exam    Airway    Mallampati score: II  TM Distance: >3 FB  Neck ROM: full     Dental   No notable dental hx     Cardiovascular  Rhythm: regular, Rate: normal, Cardiovascular exam normal    Pulmonary  Pulmonary exam normal Breath sounds clear to auscultation,     Other Findings        Anesthesia Plan  ASA Score- 2     Anesthesia Type- IV sedation with anesthesia with ASA Monitors  Additional Monitors:   Airway Plan:           Plan Factors-Exercise tolerance (METS): >4 METS  Chart reviewed  Existing labs reviewed  Patient summary reviewed  Patient is not a current smoker  Induction- intravenous  Postoperative Plan-     Informed Consent- Anesthetic plan and risks discussed with patient  I personally reviewed this patient with the CRNA  Discussed and agreed on the Anesthesia Plan with the CRNA  Khadra Barber

## 2022-12-17 NOTE — ANESTHESIA POSTPROCEDURE EVALUATION
Post-Op Assessment Note    CV Status:  Stable  Pain Score: 0    Pain management: adequate     Mental Status:  Arousable and sleepy   Hydration Status:  Euvolemic   PONV Controlled:  Controlled   Airway Patency:  Patent      Post Op Vitals Reviewed: Yes      Staff: CRNA         No notable events documented      BP   113/70   Temp     Pulse 76   Resp 18   SpO2 98% RA

## 2022-12-20 ENCOUNTER — TELEPHONE (OUTPATIENT)
Dept: GASTROENTEROLOGY | Facility: CLINIC | Age: 52
End: 2022-12-20

## 2022-12-20 DIAGNOSIS — R10.13 EPIGASTRIC PAIN: ICD-10-CM

## 2022-12-20 DIAGNOSIS — Z87.11 HISTORY OF PEPTIC ULCER: ICD-10-CM

## 2022-12-20 RX ORDER — PANTOPRAZOLE SODIUM 40 MG/1
40 TABLET, DELAYED RELEASE ORAL DAILY
Qty: 90 TABLET | Refills: 0 | Status: SHIPPED | OUTPATIENT
Start: 2022-12-20 | End: 2023-03-19

## 2022-12-20 NOTE — TELEPHONE ENCOUNTER
Patient returning your call, would like a call back because she saw a couple of abnormal things on her labs

## 2022-12-20 NOTE — TELEPHONE ENCOUNTER
----- Message from Jere Soto PA-C sent at 12/20/2022  8:19 AM EST -----  Please let patient know her hepatitis C virus level is negative! This means she cannot spread it, and her body fought the virus on its own  That's great news! Also, would recommend she get a repeat Hepatitis B vaccine with her PCP because her testing showed weak immunity  Thanks!

## 2022-12-20 NOTE — TELEPHONE ENCOUNTER
Spoke with patient  We went over results  Recommend low fat diet, avoiding red meat and fried foods  She will follow-up with PCP for HBV vaccine

## 2022-12-23 ENCOUNTER — HOSPITAL ENCOUNTER (OUTPATIENT)
Dept: ULTRASOUND IMAGING | Facility: HOSPITAL | Age: 52
End: 2022-12-23

## 2022-12-23 DIAGNOSIS — R10.13 EPIGASTRIC PAIN: ICD-10-CM

## 2022-12-23 DIAGNOSIS — B19.20 HEPATITIS C TEST POSITIVE: ICD-10-CM

## 2022-12-30 ENCOUNTER — TELEPHONE (OUTPATIENT)
Dept: GASTROENTEROLOGY | Facility: CLINIC | Age: 52
End: 2022-12-30

## 2022-12-30 NOTE — TELEPHONE ENCOUNTER
Patient returned phone call  Gave patient test results  Patient asked to speak with  Enedina Ware and had let patient know that Lonny Wolfe is not in office and that next week she will not be in either that elicia will be here  She asked if someone then can call her back   Will route the message to elicia on 1/3/2023

## 2022-12-30 NOTE — TELEPHONE ENCOUNTER
----- Message from Tish Jo PA-C sent at 12/30/2022 10:10 AM EST -----  Please let patient know that the liver does look mildly fatty  She had two benign cysts on her kidney, one on the left and one on the right  Thank you!

## 2023-01-04 ENCOUNTER — TELEPHONE (OUTPATIENT)
Dept: ADMINISTRATIVE | Facility: OTHER | Age: 53
End: 2023-01-04

## 2023-01-04 NOTE — TELEPHONE ENCOUNTER
Upon review of the In Basket request we were able to locate, review, and update the patient chart as requested for Mammogram     Any additional questions or concerns should be emailed to the Practice Liaisons via the appropriate education email address, please do not reply via In Basket      Thank you  Monica Demarco MA

## 2023-01-04 NOTE — TELEPHONE ENCOUNTER
----- Message from Víctor Cesar, [address] [place] sent at 1/3/2023  3:30 PM EST -----  Regarding: Mammo  01/03/23 3:30 PM    Hello, our patient Delfino Gamino has had Mammogram completed/performed  Please assist in updating the patient chart by pulling the Care Everywhere (CE) document  The date of service is 11/11/22       Thank you,  Víctor Cesar MA   [facility] [address] [facility]

## 2023-03-19 DIAGNOSIS — Z87.11 HISTORY OF PEPTIC ULCER: ICD-10-CM

## 2023-03-19 DIAGNOSIS — R10.13 EPIGASTRIC PAIN: ICD-10-CM

## 2023-03-19 RX ORDER — PANTOPRAZOLE SODIUM 40 MG/1
TABLET, DELAYED RELEASE ORAL
Qty: 90 TABLET | Refills: 0 | Status: SHIPPED | OUTPATIENT
Start: 2023-03-19

## 2023-05-15 ENCOUNTER — RA CDI HCC (OUTPATIENT)
Dept: OTHER | Facility: HOSPITAL | Age: 53
End: 2023-05-15

## 2023-05-15 NOTE — PROGRESS NOTES
[facility] 75  coding opportunities       Chart reviewed, no opportunity found: CHART REVIEWED, NO OPPORTUNITY FOUND        Patients Insurance        Commercial Insurance: [employer]

## 2023-05-19 ENCOUNTER — OFFICE VISIT (OUTPATIENT)
Dept: FAMILY MEDICINE CLINIC | Facility: CLINIC | Age: 53
End: 2023-05-19

## 2023-05-19 VITALS
DIASTOLIC BLOOD PRESSURE: 80 MMHG | WEIGHT: 133.4 LBS | OXYGEN SATURATION: 98 % | SYSTOLIC BLOOD PRESSURE: 124 MMHG | HEIGHT: 60 IN | HEART RATE: 71 BPM | BODY MASS INDEX: 26.19 KG/M2 | TEMPERATURE: 97 F

## 2023-05-19 DIAGNOSIS — Z00.00 ANNUAL PHYSICAL EXAM: Primary | ICD-10-CM

## 2023-05-19 DIAGNOSIS — Z91.09 ENVIRONMENTAL ALLERGIES: ICD-10-CM

## 2023-05-19 DIAGNOSIS — J40 BRONCHITIS: ICD-10-CM

## 2023-05-19 DIAGNOSIS — I10 ESSENTIAL HYPERTENSION: ICD-10-CM

## 2023-05-19 DIAGNOSIS — Z00.00 HEALTHCARE MAINTENANCE: ICD-10-CM

## 2023-05-19 PROBLEM — R10.9 ABDOMINAL CRAMPING: Status: RESOLVED | Noted: 2021-10-22 | Resolved: 2023-05-19

## 2023-05-19 RX ORDER — MONTELUKAST SODIUM 10 MG/1
10 TABLET ORAL
Qty: 30 TABLET | Refills: 5 | Status: SHIPPED | OUTPATIENT
Start: 2023-05-19

## 2023-05-19 RX ORDER — AZITHROMYCIN 250 MG/1
TABLET, FILM COATED ORAL
Qty: 6 TABLET | Refills: 0 | Status: SHIPPED | OUTPATIENT
Start: 2023-05-19 | End: 2023-05-24

## 2023-05-19 RX ORDER — AMLODIPINE BESYLATE AND BENAZEPRIL HYDROCHLORIDE 10; 40 MG/1; MG/1
1 CAPSULE ORAL DAILY
Qty: 90 CAPSULE | Refills: 1 | Status: SHIPPED | OUTPATIENT
Start: 2023-05-19

## 2023-05-19 NOTE — PROGRESS NOTES
Patient presents for annual physical  She is struggling with allergy symptoms--- congestion, rhinorrhea, fatigue, itchy/watery eyes  She has tried zyrtec with no relief  [facility] [place]    NAME: Andi Quiñonez  AGE: 47 y o  SEX: female  : 1970     DATE: 2023     Assessment and Plan:     Problem List Items Addressed This Visit        Cardiovascular and Mediastinum    Essential hypertension     Blood pressure is well controlled with Lotrel daily  Will return in 6 months for recheck  Relevant Medications    amLODIPine-benazepril (LOTREL) 10-40 MG per capsule       Other    Environmental allergies     We will start on Singulair daily  Advised to call if not improving  Relevant Medications    montelukast (SINGULAIR) 10 mg tablet   Other Visit Diagnoses     Annual physical exam    -  Primary    Healthcare maintenance        Relevant Orders    CBC and differential    Comprehensive metabolic panel    Lipid panel    TSH, 3rd generation with Free T4 reflex    Bronchitis        We will treat with Z-Gautam  Advised to call if not improving  Relevant Medications    montelukast (SINGULAIR) 10 mg tablet    azithromycin (Zithromax) 250 mg tablet          Immunizations and preventive care screenings were discussed with patient today  Appropriate education was printed on patient's after visit summary  Counseling:  · Exercise: the importance of regular exercise/physical activity was discussed  Recommend exercise 3-5 times per week for at least 30 minutes  BMI Counseling: Body mass index is 26 05 kg/m²  The BMI is above normal  Nutrition recommendations include decreasing portion sizes, encouraging healthy choices of fruits and vegetables, limiting drinks that contain sugar and moderation in carbohydrate intake   Exercise recommendations include moderate physical activity 150 minutes/week and exercising 3-5 times per week  Rationale for BMI follow-up plan is due to patient being overweight or obese  Depression Screening and Follow-up Plan: Patient was screened for depression during today's encounter  They screened negative with a PHQ-2 score of 0  Return in about 6 months (around 11/19/2023)  Chief Complaint:     Chief Complaint   Patient presents with   • Physical Exam      History of Present Illness:     Adult Annual Physical   Patient here for a comprehensive physical exam  The patient reports problems - allergies  Diet and Physical Activity  · Diet/Nutrition: well balanced diet  · Exercise: 1-2 times a week on average  Depression Screening  PHQ-2/9 Depression Screening    Little interest or pleasure in doing things: 0 - not at all  Feeling down, depressed, or hopeless: 0 - not at all  PHQ-2 Score: 0  PHQ-2 Interpretation: Negative depression screen       General Health  · Sleep: sleeps poorly  · Hearing: normal - none   · Vision: goes for regular eye exams and wears glasses  · Dental: regular dental visits  /GYN Health  · Patient is: perimenopausal  · Last menstrual period: mid october  · Contraceptive method: condoms  Review of Systems:     Review of Systems   Constitutional: Positive for fatigue and fever (sometimes in the morning)  Negative for chills and diaphoresis  HENT: Positive for congestion, postnasal drip, rhinorrhea and sore throat  Negative for sinus pressure and sinus pain  Respiratory: Positive for cough and wheezing  Negative for chest tightness and shortness of breath  Cardiovascular: Negative for chest pain and palpitations  Gastrointestinal: Positive for diarrhea  Negative for constipation, nausea and vomiting  Genitourinary: Negative  Musculoskeletal: Negative for myalgias  Allergic/Immunologic: Positive for environmental allergies  Neurological: Positive for headaches     Psychiatric/Behavioral: Positive for sleep disturbance  Negative for dysphoric mood  The patient is not nervous/anxious  Past Medical History:     Past Medical History:   Diagnosis Date   • Hepatitis C    • Hypertension       Past Surgical History:     Past Surgical History:   Procedure Laterality Date   • CHOLECYSTECTOMY        Social History:     Social History     Socioeconomic History   • Marital status: /Civil Union     Spouse name: None   • Number of children: None   • Years of education: None   • Highest education level: None   Occupational History   • None   Tobacco Use   • Smoking status: Never   • Smokeless tobacco: Never   Vaping Use   • Vaping Use: Never used   Substance and Sexual Activity   • Alcohol use: Yes     Comment: social drinker    • Drug use: Never   • Sexual activity: None   Other Topics Concern   • None   Social History Narrative   • None     Social Determinants of Health     Financial Resource Strain: Not on file   Food Insecurity: Not on file   Transportation Needs: Not on file   Physical Activity: Not on file   Stress: Not on file   Social Connections: Not on file   Intimate Partner Violence: Not on file   Housing Stability: Not on file      Family History:     Family History   Problem Relation Age of Onset   • Stomach cancer Mother    • Asthma Mother    • Hypertension Father    • Breast cancer Cousin       Current Medications:     Current Outpatient Medications   Medication Sig Dispense Refill   • acetaminophen (TYLENOL) 500 mg tablet Take 500-1,000 mg by mouth     • amLODIPine-benazepril (LOTREL) 10-40 MG per capsule Take 1 capsule by mouth daily 90 capsule 1   • azithromycin (Zithromax) 250 mg tablet Take 2 tablets (500 mg total) by mouth daily for 1 day, THEN 1 tablet (250 mg total) daily for 4 days   6 tablet 0   • montelukast (SINGULAIR) 10 mg tablet Take 1 tablet (10 mg total) by mouth daily at bedtime 30 tablet 5   • pantoprazole (PROTONIX) 40 mg tablet TAKE 1 TABLET BY MOUTH EVERY DAY 90 tablet 0     No current facility-administered medications for this visit  Allergies: Allergies   Allergen Reactions   • Shrimp Extract Allergy Skin Test - Food Allergy Anaphylaxis     itchy   • Shrimp Flavor - Food Allergy Anaphylaxis     itchy   • Bee Pollen Hives     Weather change    • Pollen Extract Allergic Rhinitis     Weather change       Physical Exam:     /80 (BP Location: Left arm, Patient Position: Sitting, Cuff Size: Standard)   Pulse 71   Temp (!) 97 °F (36 1 °C)   Ht 5' (1 524 m)   Wt 60 5 kg (133 lb 6 4 oz)   SpO2 98%   BMI 26 05 kg/m²     Physical Exam  Vitals and nursing note reviewed  Constitutional:       General: She is not in acute distress  Appearance: She is well-developed  HENT:      Head: Normocephalic and atraumatic  Right Ear: Tympanic membrane normal       Left Ear: Tympanic membrane normal       Nose: Congestion present  Mouth/Throat:      Pharynx: Posterior oropharyngeal erythema present  Eyes:      Conjunctiva/sclera: Conjunctivae normal    Cardiovascular:      Rate and Rhythm: Normal rate and regular rhythm  Heart sounds: No murmur heard  Pulmonary:      Effort: Pulmonary effort is normal  No respiratory distress  Breath sounds: Normal breath sounds  Abdominal:      Palpations: Abdomen is soft  Tenderness: There is no abdominal tenderness  Musculoskeletal:         General: No swelling  Cervical back: Neck supple  Skin:     General: Skin is warm and dry  Capillary Refill: Capillary refill takes less than 2 seconds  Neurological:      Mental Status: She is alert and oriented to person, place, and time     Psychiatric:         Mood and Affect: Mood normal           Gail Campbell, [address] [facility]

## 2023-10-25 LAB
ALBUMIN SERPL-MCNC: 4.9 G/DL (ref 3.8–4.9)
ALBUMIN/GLOB SERPL: 1.9 {RATIO} (ref 1.2–2.2)
ALP SERPL-CCNC: 104 IU/L (ref 44–121)
ALT SERPL-CCNC: 102 IU/L (ref 0–32)
AST SERPL-CCNC: 48 IU/L (ref 0–40)
BASOPHILS # BLD AUTO: 0.1 X10E3/UL (ref 0–0.2)
BASOPHILS NFR BLD AUTO: 2 %
BILIRUB SERPL-MCNC: 0.6 MG/DL (ref 0–1.2)
BUN SERPL-MCNC: 12 MG/DL (ref 6–24)
BUN/CREAT SERPL: 15 (ref 9–23)
CALCIUM SERPL-MCNC: 9.7 MG/DL (ref 8.7–10.2)
CHLORIDE SERPL-SCNC: 104 MMOL/L (ref 96–106)
CHOLEST SERPL-MCNC: 225 MG/DL (ref 100–199)
CO2 SERPL-SCNC: 24 MMOL/L (ref 20–29)
CREAT SERPL-MCNC: 0.81 MG/DL (ref 0.57–1)
EGFR: 87 ML/MIN/1.73
EOSINOPHIL # BLD AUTO: 0.5 X10E3/UL (ref 0–0.4)
EOSINOPHIL NFR BLD AUTO: 7 %
ERYTHROCYTE [DISTWIDTH] IN BLOOD BY AUTOMATED COUNT: 13.3 % (ref 11.7–15.4)
GLOBULIN SER-MCNC: 2.6 G/DL (ref 1.5–4.5)
GLUCOSE SERPL-MCNC: 111 MG/DL (ref 70–99)
HCT VFR BLD AUTO: 40.9 % (ref 34–46.6)
HDLC SERPL-MCNC: 56 MG/DL
HGB BLD-MCNC: 14.1 G/DL (ref 11.1–15.9)
IMM GRANULOCYTES # BLD: 0 X10E3/UL (ref 0–0.1)
IMM GRANULOCYTES NFR BLD: 0 %
LDLC SERPL CALC-MCNC: 150 MG/DL (ref 0–99)
LYMPHOCYTES # BLD AUTO: 2.1 X10E3/UL (ref 0.7–3.1)
LYMPHOCYTES NFR BLD AUTO: 34 %
MCH RBC QN AUTO: 30.7 PG (ref 26.6–33)
MCHC RBC AUTO-ENTMCNC: 34.5 G/DL (ref 31.5–35.7)
MCV RBC AUTO: 89 FL (ref 79–97)
MONOCYTES # BLD AUTO: 0.4 X10E3/UL (ref 0.1–0.9)
MONOCYTES NFR BLD AUTO: 6 %
NEUTROPHILS # BLD AUTO: 3.3 X10E3/UL (ref 1.4–7)
NEUTROPHILS NFR BLD AUTO: 51 %
PLATELET # BLD AUTO: 272 X10E3/UL (ref 150–450)
POTASSIUM SERPL-SCNC: 4 MMOL/L (ref 3.5–5.2)
PROT SERPL-MCNC: 7.5 G/DL (ref 6–8.5)
RBC # BLD AUTO: 4.6 X10E6/UL (ref 3.77–5.28)
SL AMB VLDL CHOLESTEROL CALC: 19 MG/DL (ref 5–40)
SODIUM SERPL-SCNC: 142 MMOL/L (ref 134–144)
TRIGL SERPL-MCNC: 108 MG/DL (ref 0–149)
TSH SERPL DL<=0.005 MIU/L-ACNC: 2.8 UIU/ML (ref 0.45–4.5)
WBC # BLD AUTO: 6.4 X10E3/UL (ref 3.4–10.8)

## 2023-11-12 DIAGNOSIS — I10 ESSENTIAL HYPERTENSION: ICD-10-CM

## 2023-11-12 RX ORDER — AMLODIPINE AND BENAZEPRIL HYDROCHLORIDE 10; 40 MG/1; MG/1
1 CAPSULE ORAL DAILY
Qty: 90 CAPSULE | Refills: 1 | Status: SHIPPED | OUTPATIENT
Start: 2023-11-12 | End: 2023-11-20 | Stop reason: SDUPTHER

## 2023-11-17 ENCOUNTER — TELEPHONE (OUTPATIENT)
Dept: ADMINISTRATIVE | Facility: OTHER | Age: 53
End: 2023-11-17

## 2023-11-17 NOTE — TELEPHONE ENCOUNTER
Upon review of the In Basket request we were able to note that no further action is required. The patient chart is up to date as a result of a previous request.      Any additional questions or concerns should be emailed to the Practice Liaisons via the appropriate education email address, please do not reply via In Basket.     Thank you  Mary Wilkins MA

## 2023-11-20 ENCOUNTER — OFFICE VISIT (OUTPATIENT)
Dept: FAMILY MEDICINE CLINIC | Facility: CLINIC | Age: 53
End: 2023-11-20
Payer: COMMERCIAL

## 2023-11-20 VITALS
BODY MASS INDEX: 26.11 KG/M2 | DIASTOLIC BLOOD PRESSURE: 80 MMHG | HEIGHT: 60 IN | TEMPERATURE: 97.2 F | RESPIRATION RATE: 12 BRPM | OXYGEN SATURATION: 97 % | HEART RATE: 70 BPM | SYSTOLIC BLOOD PRESSURE: 110 MMHG | WEIGHT: 133 LBS

## 2023-11-20 DIAGNOSIS — E78.2 MIXED HYPERLIPIDEMIA: ICD-10-CM

## 2023-11-20 DIAGNOSIS — R79.89 ELEVATED LIVER FUNCTION TESTS: ICD-10-CM

## 2023-11-20 DIAGNOSIS — Z87.11 HISTORY OF PEPTIC ULCER: ICD-10-CM

## 2023-11-20 DIAGNOSIS — Z91.09 ENVIRONMENTAL ALLERGIES: ICD-10-CM

## 2023-11-20 DIAGNOSIS — F51.01 PRIMARY INSOMNIA: ICD-10-CM

## 2023-11-20 DIAGNOSIS — Z00.00 HEALTHCARE MAINTENANCE: Primary | ICD-10-CM

## 2023-11-20 DIAGNOSIS — I10 ESSENTIAL HYPERTENSION: ICD-10-CM

## 2023-11-20 DIAGNOSIS — Z12.31 ENCOUNTER FOR SCREENING MAMMOGRAM FOR MALIGNANT NEOPLASM OF BREAST: ICD-10-CM

## 2023-11-20 DIAGNOSIS — R10.13 EPIGASTRIC PAIN: ICD-10-CM

## 2023-11-20 PROCEDURE — 99214 OFFICE O/P EST MOD 30 MIN: CPT | Performed by: NURSE PRACTITIONER

## 2023-11-20 RX ORDER — TRAZODONE HYDROCHLORIDE 50 MG/1
50 TABLET ORAL
Qty: 30 TABLET | Refills: 1 | Status: SHIPPED | OUTPATIENT
Start: 2023-11-20

## 2023-11-20 RX ORDER — MONTELUKAST SODIUM 10 MG/1
10 TABLET ORAL
Qty: 90 TABLET | Refills: 1 | Status: SHIPPED | OUTPATIENT
Start: 2023-11-20

## 2023-11-20 RX ORDER — AMLODIPINE AND BENAZEPRIL HYDROCHLORIDE 10; 40 MG/1; MG/1
1 CAPSULE ORAL DAILY
Qty: 90 CAPSULE | Refills: 1 | Status: SHIPPED | OUTPATIENT
Start: 2023-11-20

## 2023-11-20 RX ORDER — PANTOPRAZOLE SODIUM 40 MG/1
40 TABLET, DELAYED RELEASE ORAL DAILY
Qty: 90 TABLET | Refills: 1 | Status: SHIPPED | OUTPATIENT
Start: 2023-11-20

## 2023-11-20 NOTE — PROGRESS NOTES
Name: Elisabeth Cobos      : 1970      MRN: [id number]  Encounter Provider: MARIANO Pastor  Encounter Date: 2023   Encounter department: [address] [place] [address]     1. Healthcare maintenance  -     CBC and differential; Future  -     Comprehensive metabolic panel; Future  -     Lipid panel; Future  -     TSH, 3rd generation with Free T4 reflex; Future    2. Essential hypertension  Assessment & Plan:  Blood pressure is well managed with Lotrel daily. Orders:  -     amLODIPine-benazepril (LOTREL) 10-40 MG per capsule; Take 1 capsule by mouth daily  -     CBC and differential; Future  -     Comprehensive metabolic panel; Future  -     Lipid panel; Future  -     TSH, 3rd generation with Free T4 reflex; Future    3. Environmental allergies  -     montelukast (SINGULAIR) 10 mg tablet; Take 1 tablet (10 mg total) by mouth daily at bedtime    4. Epigastric pain  -     pantoprazole (PROTONIX) 40 mg tablet; Take 1 tablet (40 mg total) by mouth daily    5. History of peptic ulcer  -     pantoprazole (PROTONIX) 40 mg tablet; Take 1 tablet (40 mg total) by mouth daily    6. Encounter for screening mammogram for malignant neoplasm of breast  -     Mammo screening bilateral w 3d & cad; Future; Expected date: 2023    7. Primary insomnia  -     traZODone (DESYREL) 50 mg tablet; Take 1 tablet (50 mg total) by mouth daily at bedtime as needed for sleep    8. Elevated liver function tests  Assessment & Plan:  Advised low fat diet, exercise--- she did have previous work up with GI with showed fatty liver. Advised to avoid tylenol. 9. Mixed hyperlipidemia  Assessment & Plan:  Discussed results with patient. Advise fish oil, daily exercise and low-fat diet. Depression Screening and Follow-up Plan: Patient was screened for depression during today's encounter. They screened negative with a PHQ-2 score of 1.         Subjective      Patient presents for routine follow-up. She recently had blood work done which showed elevated liver function and elevated lipid panel. Patient states she was advised to start on a statin. Patient did have previous work-up with GI for elevated liver function, showed fatty liver. Patient's  was recently admitted for emergent CABG. Patient is very stressed and has not slept. She is not a great sleeper normally. Review of Systems   Constitutional:  Negative for chills, diaphoresis and fever. HENT:  Negative for ear pain and sore throat. Eyes:  Negative for pain and visual disturbance. Respiratory:  Negative for cough and shortness of breath. Cardiovascular:  Negative for chest pain and palpitations. Gastrointestinal:  Negative for abdominal pain and vomiting. Genitourinary:  Negative for dysuria and hematuria. Musculoskeletal:  Negative for arthralgias and back pain. Skin:  Negative for color change and rash. Neurological:  Negative for seizures and syncope. Psychiatric/Behavioral:  Positive for sleep disturbance. All other systems reviewed and are negative. Current Outpatient Medications on File Prior to Visit   Medication Sig    [DISCONTINUED] amLODIPine-benazepril (LOTREL) 10-40 MG per capsule TAKE 1 CAPSULE BY MOUTH EVERY DAY    [DISCONTINUED] montelukast (SINGULAIR) 10 mg tablet Take 1 tablet (10 mg total) by mouth daily at bedtime    [DISCONTINUED] pantoprazole (PROTONIX) 40 mg tablet TAKE 1 TABLET BY MOUTH EVERY DAY    acetaminophen (TYLENOL) 500 mg tablet Take 500-1,000 mg by mouth       Objective     /80   Pulse 70   Temp (!) 97.2 °F (36.2 °C)   Resp 12   Ht 5' (1.524 m)   Wt 60.3 kg (133 lb)   SpO2 97%   BMI 25.97 kg/m²     Physical Exam  Constitutional:       Appearance: She is well-developed. Cardiovascular:      Rate and Rhythm: Normal rate and regular rhythm. Heart sounds: Normal heart sounds. No murmur heard.   Pulmonary:      Effort: Pulmonary effort is normal. No respiratory distress. Breath sounds: Normal breath sounds. Skin:     General: Skin is warm and dry. Neurological:      Mental Status: She is alert and oriented to person, place, and time.        MARIANO Sandy Ace

## 2023-11-20 NOTE — ASSESSMENT & PLAN NOTE
Advised low fat diet, exercise--- she did have previous work up with GI with showed fatty liver. Advised to avoid tylenol.

## 2023-12-11 ENCOUNTER — HOSPITAL ENCOUNTER (OUTPATIENT)
Dept: ULTRASOUND IMAGING | Facility: HOSPITAL | Age: 53
Discharge: HOME | End: 2023-12-11
Payer: COMMERCIAL

## 2023-12-11 DIAGNOSIS — R80.1 PERSISTENT PROTEINURIA, UNSPECIFIED: ICD-10-CM

## 2023-12-11 DIAGNOSIS — N20.0 CALCULUS OF KIDNEY: ICD-10-CM

## 2023-12-11 DIAGNOSIS — E55.9 VITAMIN D DEFICIENCY, UNSPECIFIED: ICD-10-CM

## 2023-12-11 DIAGNOSIS — I10 ESSENTIAL (PRIMARY) HYPERTENSION: ICD-10-CM

## 2023-12-11 PROCEDURE — 76775 US EXAM ABDO BACK WALL LIM: CPT

## 2024-01-24 DIAGNOSIS — F51.01 PRIMARY INSOMNIA: ICD-10-CM

## 2024-01-24 RX ORDER — TRAZODONE HYDROCHLORIDE 50 MG/1
50 TABLET ORAL
Qty: 30 TABLET | Refills: 1 | Status: SHIPPED | OUTPATIENT
Start: 2024-01-24

## 2024-03-22 DIAGNOSIS — F51.01 PRIMARY INSOMNIA: ICD-10-CM

## 2024-03-22 RX ORDER — TRAZODONE HYDROCHLORIDE 50 MG/1
50 TABLET ORAL
Qty: 30 TABLET | Refills: 1 | Status: SHIPPED | OUTPATIENT
Start: 2024-03-22

## 2024-05-11 DIAGNOSIS — Z91.09 ENVIRONMENTAL ALLERGIES: ICD-10-CM

## 2024-05-13 RX ORDER — MONTELUKAST SODIUM 10 MG/1
10 TABLET ORAL
Qty: 90 TABLET | Refills: 1 | Status: SHIPPED | OUTPATIENT
Start: 2024-05-13

## 2024-05-15 ENCOUNTER — TELEPHONE (OUTPATIENT)
Age: 54
End: 2024-05-15

## 2024-05-15 NOTE — TELEPHONE ENCOUNTER
Called patient to clarify fax number and to obtain verbal consent for myself. Upon speaking with patient, she asked if there was a urine test in her outstanding order- I informed her there was not. She asked if Gail could please order a urine test and any other testing she thinks is appropriate for her being that she is still looking for a new nephrologist and has not followed up in some time. She also wanted to know if there were any specific labs she could do to monitor her fatty liver?    Please advise, thank you!

## 2024-05-15 NOTE — TELEPHONE ENCOUNTER
Pt called, asked if her blood work orders could be faxed to her. She does not live near office [facility] require her to bring in physical copy of the orders. Has an upcoming appt on 5/20 and needs to get the labs done before 5/19. Please advise and fax orders to #[phone]

## 2024-05-16 DIAGNOSIS — Z00.00 HEALTHCARE MAINTENANCE: Primary | ICD-10-CM

## 2024-05-20 ENCOUNTER — OFFICE VISIT (OUTPATIENT)
Dept: FAMILY MEDICINE CLINIC | Facility: CLINIC | Age: 54
End: 2024-05-20
Payer: COMMERCIAL

## 2024-05-20 ENCOUNTER — TELEPHONE (OUTPATIENT)
Dept: ADMINISTRATIVE | Facility: OTHER | Age: 54
End: 2024-05-20

## 2024-05-20 VITALS
SYSTOLIC BLOOD PRESSURE: 110 MMHG | RESPIRATION RATE: 16 BRPM | OXYGEN SATURATION: 96 % | HEART RATE: 80 BPM | HEIGHT: 60 IN | TEMPERATURE: 97.7 F | DIASTOLIC BLOOD PRESSURE: 80 MMHG | BODY MASS INDEX: 26.58 KG/M2 | WEIGHT: 135.4 LBS

## 2024-05-20 DIAGNOSIS — N95.0 PMB (POSTMENOPAUSAL BLEEDING): ICD-10-CM

## 2024-05-20 DIAGNOSIS — F51.01 PRIMARY INSOMNIA: Primary | ICD-10-CM

## 2024-05-20 DIAGNOSIS — R50.9 FEVER, UNSPECIFIED FEVER CAUSE: ICD-10-CM

## 2024-05-20 DIAGNOSIS — I10 ESSENTIAL HYPERTENSION: ICD-10-CM

## 2024-05-20 DIAGNOSIS — R79.89 ELEVATED LIVER FUNCTION TESTS: ICD-10-CM

## 2024-05-20 PROCEDURE — 99214 OFFICE O/P EST MOD 30 MIN: CPT | Performed by: NURSE PRACTITIONER

## 2024-05-20 RX ORDER — ESZOPICLONE 2 MG/1
2 TABLET, FILM COATED ORAL
Qty: 30 TABLET | Refills: 0 | Status: SHIPPED | OUTPATIENT
Start: 2024-05-20

## 2024-05-20 NOTE — TELEPHONE ENCOUNTER
----- Message from Bar GOETZ sent at 5/20/2024  7:39 AM EDT -----  Regarding: hm on pap  05/20/24 7:39 AM    Nicolás, our patient Bing Levy has had Pap Smear (HPV) aka Cervical Cancer Screening completed/performed. Please assist in updating the patient chart by pulling the document from lab Tab within Chart Review. The date of service is 05/19/2024.     Thank you,  Kadedra Jackson-Reyes, MA [facility] [address] [place] [address]

## 2024-05-20 NOTE — PROGRESS NOTES
Assessment/Plan:     Chronic Problems:  Essential hypertension  Blood pressure is well-managed with Lotrel daily.    Elevated liver function tests  Patient did have labs done at outside lab.  Waiting for results.      Visit Diagnosis:  Diagnoses and all orders for this visit:    Primary insomnia  -     eszopiclone (LUNESTA) 2 mg tablet; Take 1 tablet (2 mg total) by mouth daily at bedtime as needed for sleep Take immediately before bedtime    Fever, unspecified fever cause  Comments:  Advised patient to have lyme Testing Fever does not resolve.  Fever is reducing the past few days.  Orders:  -     Lyme Total AB W Reflex to IGM/IGG; Future    PMB (postmenopausal bleeding)  Comments:  she did have recent CT urogram which shows cystic uterus. she has f/u with GYN.  Orders:  -     US pelvis complete w transvaginal; Future    Essential hypertension    Elevated liver function tests          Subjective:    Patient ID: Bing Levy is a 54 y.o. female.    Patient presents for routine follow up. Concerned with fevers. This is mostly in the afternoon. This has been for about 10 days. She was very fatigued last week. 101-102, chills. Taking tylenol--she is taking 1 every 6-8 hours. Went to urgent care last week and negative exam. She did have some blood in her urine, but is also having some vaginal spotting. February 2024 would have been 1 year without a period.         The following portions of the patient's history were reviewed and updated as appropriate: allergies, current medications, past family history, past medical history, past social history, past surgical history and problem list.    Review of Systems   Constitutional:  Positive for appetite change, chills, diaphoresis, fatigue and fever.   HENT:  Negative for congestion, sinus pressure, sinus pain and sore throat.    Respiratory:  Positive for shortness of breath (soboe). Negative for cough, chest tightness and wheezing.    Cardiovascular:  Negative for chest pain  and palpitations.   Gastrointestinal:  Positive for diarrhea and nausea. Negative for abdominal pain, constipation and vomiting.   Genitourinary:  Positive for menstrual problem. Negative for dysuria, frequency and urgency.   Musculoskeletal:  Positive for myalgias.   Neurological:  Positive for headaches. Negative for dizziness and light-headedness.   Psychiatric/Behavioral:  Negative for dysphoric mood. The patient is not nervous/anxious.          /80   Pulse 80   Temp 97.7 °F (36.5 °C)   Resp 16   Ht 5' (1.524 m)   Wt 61.4 kg (135 lb 6.4 oz)   SpO2 96%   BMI 26.44 kg/m²   Social History     Socioeconomic History    Marital status: /Civil Union     Spouse name: Not on file    Number of children: Not on file    Years of education: Not on file    Highest education level: Not on file   Occupational History    Not on file   Tobacco Use    Smoking status: Never    Smokeless tobacco: Never   Vaping Use    Vaping status: Never Used   Substance and Sexual Activity    Alcohol use: Yes     Comment: social drinker     Drug use: Never    Sexual activity: Not on file   Other Topics Concern    Not on file   Social History Narrative    Not on file     Social Determinants of Health     Financial Resource Strain: Not on file   Food Insecurity: Not on file   Transportation Needs: Not on file   Physical Activity: Not on file   Stress: Not on file   Social Connections: Not on file   Intimate Partner Violence: Not on file   Housing Stability: Not on file     Past Medical History:   Diagnosis Date    Hepatitis C     Hypertension      Family History   Problem Relation Age of Onset    Stomach cancer Mother     Asthma Mother     Hypertension Father     Breast cancer Cousin      Past Surgical History:   Procedure Laterality Date    CHOLECYSTECTOMY         Current Outpatient Medications:     amLODIPine-benazepril (LOTREL) 10-40 MG per capsule, Take 1 capsule by mouth daily, Disp: 90 capsule, Rfl: 1    eszopiclone  (LUNESTA) 2 mg tablet, Take 1 tablet (2 mg total) by mouth daily at bedtime as needed for sleep Take immediately before bedtime, Disp: 30 tablet, Rfl: 0    montelukast (SINGULAIR) 10 mg tablet, TAKE 1 TABLET BY MOUTH DAILY AT BEDTIME, Disp: 90 tablet, Rfl: 1    pantoprazole (PROTONIX) 40 mg tablet, Take 1 tablet (40 mg total) by mouth daily, Disp: 90 tablet, Rfl: 1    acetaminophen (TYLENOL) 500 mg tablet, Take 500-1,000 mg by mouth, Disp: , Rfl:     Allergies   Allergen Reactions    Shrimp Extract Allergy Skin Test - Food Allergy Anaphylaxis     itchy    Shrimp Flavor - Food Allergy Anaphylaxis     itchy    Bee Pollen Hives     Weather change     Pollen Extract Allergic Rhinitis     Weather change           Lab Review   No visits with results within 2 Month(s) from this visit.   Latest known visit with results is:   Orders Only on 10/24/2023   Component Date Value    White Blood Cell Count 10/24/2023 6.4     Red Blood Cell Count 10/24/2023 4.60     Hemoglobin 10/24/2023 14.1     HCT 10/24/2023 40.9     MCV 10/24/2023 89     MCH 10/24/2023 30.7     MCHC 10/24/2023 34.5     RDW 10/24/2023 13.3     Platelet Count 10/24/2023 272     Neutrophils 10/24/2023 51     Lymphocytes 10/24/2023 34     Monocytes 10/24/2023 6     Eosinophils 10/24/2023 7     Basophils PCT 10/24/2023 2     Neutrophils (Absolute) 10/24/2023 3.3     Lymphocytes (Absolute) 10/24/2023 2.1     Monocytes (Absolute) 10/24/2023 0.4     Eosinophils (Absolute) 10/24/2023 0.5 (H)     Basophils ABS 10/24/2023 0.1     Immature Granulocytes 10/24/2023 0     Immature Granulocytes (A* 10/24/2023 0.0     Glucose, Random 10/24/2023 111 (H)     BUN 10/24/2023 12     Creatinine 10/24/2023 0.81     eGFR 10/24/2023 87     SL AMB BUN/CREATININE RA* 10/24/2023 15     Sodium 10/24/2023 142     Potassium 10/24/2023 4.0     Chloride 10/24/2023 104     CO2 10/24/2023 24     CALCIUM 10/24/2023 9.7     Protein, Total 10/24/2023 7.5     Albumin 10/24/2023 4.9     Globulin, Total  10/24/2023 2.6     Albumin/Globulin Ratio 10/24/2023 1.9     TOTAL BILIRUBIN 10/24/2023 0.6     Alk Phos Isoenzymes 10/24/2023 104     AST 10/24/2023 48 (H)     ALT 10/24/2023 102 (H)     Cholesterol, Total 10/24/2023 225 (H)     Triglycerides 10/24/2023 108     HDL 10/24/2023 56     VLDL Cholesterol Calcula* 10/24/2023 19     LDL Calculated 10/24/2023 150 (H)     TSH 10/24/2023 2.800         Imaging: CT UROGRAM    Result Date: 5/10/2024  Narrative: Clinical History: Hydronephrosis. Comparison: Prior CT of the abdomen and pelvis, 7/11/2015 Comment: CT of the abdomen and pelvis was performed after the administration of 90 cc of Omnipaque 350 intravenous contrast.   Abdomen: Lower chest: Minimal dependent atelectatic changes seen at the right lung base. Liver: Diffuse decrease in hepatic density with relative sparing along gallbladder fossa and luis hepatis is compatible fatty infiltration. Liver is enlarged, measuring 19.1 cm craniocaudally. Spleen: Unremarkable Pancreas: Unremarkable Adrenal glands: Unremarkable gallbladder/bile ducts: Status post cholecystectomy. No biliary ductal dilatation Kidneys/ureters: 3 mm nonobstructing right interpolar renal calculus. No ureteral calculus or hydroureteronephrosis to suggest obstructive uropathy. 1.4 cm right interpolar and 2.0 cm left lower pole renal cysts. Tiny subcentimeter low-density lesion within the left interpolar kidney posterior laterally is too small to characterize but statistically represent cysts as well. Both kidneys enhance symmetrically, normal bilateral renal excretion. No filling defects within the opacified urinary collecting system on delayed images. Bowel: Small and large bowel normal in caliber. Appendix is normal in appearance. Lymph nodes: No retroperitoneal or mesenteric adenopathy Peritoneum: No free fluid or free intraperitoneal air the abdomen Vessels: Abdominal aorta normal in caliber. Abdominal wall: Unremarkable. Pelvis: Urinary bladder:  "Underdistended. No bladder calculus. Lymph nodes: No pelvic adenopathy. Incidental note is made of enlarged heterogeneous uterus, with multiple small cysts. This is nonspecific but may reflect adenomyosis. Clinical correlation is needed. Pelvic ultrasound or MRI can be performed for further evaluation. Bones: Osseous structures intact.    Impression: IMPRESSION: 1.  3 mm nonobstructing right interpolar renal calculus. No ureteral calculus or hydroureteronephrosis to suggest obstructive uropathy. 2.  Small bilateral renal cysts. 3.  Hepatomegaly with diffuse hepatic fatty infiltration. 4.  Enlarged heterogeneous uterus with multiple small cysts. This is nonspecific but may reflect adenomyosis. Pelvic ultrasound or MRI can be performed for further evaluation. 5.  Other findings as above. Workstation:[id number]      Objective:     Physical Exam  Constitutional:       Appearance: She is well-developed.   HENT:      Right Ear: Tympanic membrane normal.      Left Ear: Tympanic membrane normal.      Nose: No congestion.      Mouth/Throat:      Pharynx: No oropharyngeal exudate.   Cardiovascular:      Rate and Rhythm: Normal rate and regular rhythm.      Heart sounds: Normal heart sounds. No murmur heard.  Pulmonary:      Effort: Pulmonary effort is normal. No respiratory distress.      Breath sounds: Normal breath sounds.   Abdominal:      Tenderness: There is no abdominal tenderness.   Skin:     General: Skin is warm and dry.   Neurological:      Mental Status: She is alert and oriented to person, place, and time.   Psychiatric:         Mood and Affect: Mood normal.           There are no Patient Instructions on file for this visit.    MARIANO Mejia    Portions of the record may have been created with voice recognition software.  Occasional wrong word or \"sound a like\" substitutions may have occurred due to the inherent limitations of voice recognition software.  Read the chart carefully and recognize, using context, " where substitutions have occurred.

## 2024-05-20 NOTE — TELEPHONE ENCOUNTER
Upon review of the In Basket request we were able to locate, review, and update the patient chart as requested for Pap Smear (HPV) aka Cervical Cancer Screening.    Any additional questions or concerns should be emailed to the Practice Liaisons via the appropriate education email address, please do not reply via In Basket.    Thank you  Misha Santiago MA

## 2024-05-21 ENCOUNTER — TELEPHONE (OUTPATIENT)
Age: 54
End: 2024-05-21

## 2024-05-21 LAB
ALBUMIN SERPL-MCNC: 4.3 G/DL (ref 3.8–4.9)
ALBUMIN/GLOB SERPL: 1.3 {RATIO} (ref 1.2–2.2)
ALP SERPL-CCNC: 111 IU/L (ref 44–121)
ALT SERPL-CCNC: 28 IU/L (ref 0–32)
APPEARANCE UR: ABNORMAL
AST SERPL-CCNC: 19 IU/L (ref 0–40)
BACTERIA UR CULT: NORMAL
BACTERIA URNS QL MICRO: ABNORMAL
BASOPHILS # BLD AUTO: 0.1 X10E3/UL (ref 0–0.2)
BASOPHILS NFR BLD AUTO: 1 %
BILIRUB SERPL-MCNC: 0.6 MG/DL (ref 0–1.2)
BILIRUB UR QL STRIP: NEGATIVE
BUN SERPL-MCNC: 11 MG/DL (ref 6–24)
BUN/CREAT SERPL: 10 (ref 9–23)
CALCIUM SERPL-MCNC: 9.1 MG/DL (ref 8.7–10.2)
CASTS URNS MICRO: ABNORMAL
CASTS URNS QL MICRO: PRESENT /LPF
CHLORIDE SERPL-SCNC: 99 MMOL/L (ref 96–106)
CHOLEST SERPL-MCNC: 217 MG/DL (ref 100–199)
CO2 SERPL-SCNC: 22 MMOL/L (ref 20–29)
COLOR UR: YELLOW
CREAT SERPL-MCNC: 1.1 MG/DL (ref 0.57–1)
EGFR: 60 ML/MIN/1.73
EOSINOPHIL # BLD AUTO: 0.2 X10E3/UL (ref 0–0.4)
EOSINOPHIL NFR BLD AUTO: 3 %
EPI CELLS #/AREA URNS HPF: ABNORMAL /HPF (ref 0–10)
ERYTHROCYTE [DISTWIDTH] IN BLOOD BY AUTOMATED COUNT: 12.6 % (ref 11.7–15.4)
GLOBULIN SER-MCNC: 3.4 G/DL (ref 1.5–4.5)
GLUCOSE SERPL-MCNC: 117 MG/DL (ref 70–99)
GLUCOSE UR QL: NEGATIVE
HCT VFR BLD AUTO: 40 % (ref 34–46.6)
HDLC SERPL-MCNC: 36 MG/DL
HGB BLD-MCNC: 13.9 G/DL (ref 11.1–15.9)
HGB UR QL STRIP: ABNORMAL
IMM GRANULOCYTES # BLD: 0 X10E3/UL (ref 0–0.1)
IMM GRANULOCYTES NFR BLD: 0 %
KETONES UR QL STRIP: NEGATIVE
LDLC SERPL CALC-MCNC: 154 MG/DL (ref 0–99)
LEUKOCYTE ESTERASE UR QL STRIP: NEGATIVE
LYMPHOCYTES # BLD AUTO: 1.5 X10E3/UL (ref 0.7–3.1)
LYMPHOCYTES NFR BLD AUTO: 17 %
Lab: NORMAL
MCH RBC QN AUTO: 30.9 PG (ref 26.6–33)
MCHC RBC AUTO-ENTMCNC: 34.8 G/DL (ref 31.5–35.7)
MCV RBC AUTO: 89 FL (ref 79–97)
MICRO URNS: ABNORMAL
MONOCYTES # BLD AUTO: 0.7 X10E3/UL (ref 0.1–0.9)
MONOCYTES NFR BLD AUTO: 7 %
NEUTROPHILS # BLD AUTO: 6.6 X10E3/UL (ref 1.4–7)
NEUTROPHILS NFR BLD AUTO: 72 %
NITRITE UR QL STRIP: NEGATIVE
PH UR STRIP: 5.5 [PH] (ref 5–7.5)
PLATELET # BLD AUTO: 329 X10E3/UL (ref 150–450)
POTASSIUM SERPL-SCNC: 4.2 MMOL/L (ref 3.5–5.2)
PROT SERPL-MCNC: 7.7 G/DL (ref 6–8.5)
PROT UR QL STRIP: ABNORMAL
RBC # BLD AUTO: 4.5 X10E6/UL (ref 3.77–5.28)
RBC #/AREA URNS HPF: ABNORMAL /HPF (ref 0–2)
SL AMB T4, FREE (DIRECT): 1.1 NG/DL (ref 0.82–1.77)
SL AMB URINALYSIS REFLEX: ABNORMAL
SL AMB VLDL CHOLESTEROL CALC: 27 MG/DL (ref 5–40)
SODIUM SERPL-SCNC: 139 MMOL/L (ref 134–144)
SP GR UR: 1.02 (ref 1–1.03)
T3 SERPL-MCNC: 75 NG/DL (ref 71–180)
TRIGL SERPL-MCNC: 149 MG/DL (ref 0–149)
TSH SERPL DL<=0.005 MIU/L-ACNC: 6.01 UIU/ML (ref 0.45–4.5)
UROBILINOGEN UR STRIP-ACNC: 0.2 MG/DL (ref 0.2–1)
WBC # BLD AUTO: 9.1 X10E3/UL (ref 3.4–10.8)
WBC #/AREA URNS HPF: ABNORMAL /HPF (ref 0–5)

## 2024-05-21 NOTE — TELEPHONE ENCOUNTER
Pt is still having fevers. Should she be concerned with blood in the urine.     Can lab orders be fax to [phone]

## 2024-05-23 LAB — B BURGDOR IGG+IGM SER QL IA: NEGATIVE

## 2024-06-13 LAB
APPEARANCE UR: CLEAR
BACTERIA URNS QL MICRO: NORMAL
BILIRUB UR QL STRIP: NEGATIVE
CASTS URNS QL MICRO: NORMAL /LPF
COLOR UR: YELLOW
EPI CELLS #/AREA URNS HPF: NORMAL /HPF (ref 0–10)
GLUCOSE UR QL: NEGATIVE
HGB UR QL STRIP: NEGATIVE
KETONES UR QL STRIP: NEGATIVE
LEUKOCYTE ESTERASE UR QL STRIP: NEGATIVE
MICRO URNS: ABNORMAL
NITRITE UR QL STRIP: NEGATIVE
PH UR STRIP: 5.5 [PH] (ref 5–7.5)
PROT UR QL STRIP: ABNORMAL
RBC #/AREA URNS HPF: NORMAL /HPF (ref 0–2)
SL AMB URINALYSIS REFLEX: ABNORMAL
SP GR UR: 1.02 (ref 1–1.03)
UROBILINOGEN UR STRIP-ACNC: 0.2 MG/DL (ref 0.2–1)
WBC #/AREA URNS HPF: NORMAL /HPF (ref 0–5)

## 2024-06-14 ENCOUNTER — HOSPITAL ENCOUNTER (OUTPATIENT)
Dept: ULTRASOUND IMAGING | Facility: HOSPITAL | Age: 54
End: 2024-06-14
Payer: COMMERCIAL

## 2024-06-14 DIAGNOSIS — R80.9 PROTEINURIA, UNSPECIFIED TYPE: Primary | ICD-10-CM

## 2024-06-14 DIAGNOSIS — N95.0 PMB (POSTMENOPAUSAL BLEEDING): ICD-10-CM

## 2024-06-14 PROCEDURE — 76856 US EXAM PELVIC COMPLETE: CPT

## 2024-06-14 PROCEDURE — 76830 TRANSVAGINAL US NON-OB: CPT

## 2024-06-24 ENCOUNTER — TELEPHONE (OUTPATIENT)
Dept: FAMILY MEDICINE CLINIC | Facility: CLINIC | Age: 54
End: 2024-06-24

## 2024-06-24 NOTE — TELEPHONE ENCOUNTER
Called patient and she is aware of he US results. Can you please put in a new script for her thyroid testing that she needs to repeat? Patient wants this faxed over to [phone]

## 2024-06-25 DIAGNOSIS — R79.89 ELEVATED TSH: Primary | ICD-10-CM

## 2024-06-26 LAB
SL AMB T4, FREE (DIRECT): 1.1 NG/DL (ref 0.82–1.77)
TSH SERPL DL<=0.005 MIU/L-ACNC: 5.28 UIU/ML (ref 0.45–4.5)

## 2024-07-03 NOTE — PROGRESS NOTES
Diagnoses and all orders for this visit:    Encounter for gynecological examination without abnormal finding    Encounter for screening mammogram for malignant neoplasm of breast  -     Mammo screening bilateral w 3d & cad; Future    Screening for STDs (sexually transmitted diseases)  -     Chlamydia/GC amplified DNA by PCR        Perineal hygiene reviewed   Weight bearing exercises minium of 150 mins/weekly advised.   Kegel exercises recommended daily, see AVS for instructions and recommendations  SBE encouraged, ASCCP guidelines reviewed. Condoms encouraged with all sexual activity to prevent STI's.   Gardisil vaccines recommended up to age 45  Calcium/ Vit D dietary requirements discussed,   Advised to call with any issues,  all concerns & questions addressed.   See after visit summary for further information and recommendations to the above mentioned subjects which we may or may not have covered in detail during your visit   F/U Annually and PRN      Health Maintenance:    Last PAP: 2023 Neg/Neg  Next PAP Due:    Last Mammogram: 2022  unable to view results , dense breasts   Next Mammogram: order given    Last Colonoscopy: 2021    RTO 5 years        Subjective    CC: Yearly Exam , New patient      Bing Levy is a 54 y.o. female here for an annual exam.   GYN hx includes:  2 vaginal deliveries   Family hx of: MGA with BC, MC with BC   Medically stable, reports no changes in medical Hx, follows with PMD  She is unhappy with her weight, does not sleep well at night , has intermittent lower right leg swelling.  Patient was offered menopausal specialist referral, declines at this time    No LMP recorded. Patient is postmenopausal.  She had not had a menses for 1 full year as of 2024, in may of 2024 she noticed small amount of bleeding for 3 days, she was also having fevers at that time, diagnosed with hydronephrosis and renal calculi which is the most probable cause of bleeding,will  continue to observe for any further episodes of bleeding, advised to call the office if any further vaginal bleeding occurs   Reports history of abnormal pap smear more than 5 years, repeated every 6 months for period of time then resumed routine screening, pt does not recall having a colpo   She denies breast concerns, abnormal vaginal discharge, vaginal itching, odor, irritation, bowel dysfunction, urinary symptoms, pelvic pain today.   Reports urinary leakage with cough, advised Kegel's daily. Decline PT referral today   She is not sexually active. Monogamous relationship.   She does want STD testing today.  Denies intimate partner violence     had a MI 6 months ago,   She works for his [employer]     Past Medical History:   Diagnosis Date    Hepatitis C     Hypertension      Past Surgical History:   Procedure Laterality Date    CHOLECYSTECTOMY         Immunization History   Administered Date(s) Administered    COVID-19 PFIZER VACCINE 0.3 ML IM 03/29/2021, 04/19/2021, 10/25/2022    INFLUENZA 09/25/2020    Influenza Quadrivalent Preservative Free 3 years and older IM 10/25/2022    Influenza, recombinant, quadrivalent,injectable, preservative free 09/17/2021    Influenza, seasonal, injectable 09/12/2009    Td (adult), adsorbed 10/01/2006    Tdap 08/10/2012, 06/24/2019, 09/25/2020       Family History   Problem Relation Age of Onset    Stomach cancer Mother     Asthma Mother     Hypertension Father     Breast cancer Cousin      Social History     Tobacco Use    Smoking status: Never    Smokeless tobacco: Never   Vaping Use    Vaping status: Never Used   Substance Use Topics    Alcohol use: Yes     Comment: social drinker     Drug use: Never       Current Outpatient Medications:     acetaminophen (TYLENOL) 500 mg tablet, Take 500-1,000 mg by mouth, Disp: , Rfl:     amLODIPine-benazepril (LOTREL) 10-40 MG per capsule, Take 1 capsule by mouth daily, Disp: 90 capsule, Rfl: 1    eszopiclone (LUNESTA) 2 mg  tablet, Take 1 tablet (2 mg total) by mouth daily at bedtime as needed for sleep Take immediately before bedtime, Disp: 30 tablet, Rfl: 0    montelukast (SINGULAIR) 10 mg tablet, TAKE 1 TABLET BY MOUTH DAILY AT BEDTIME, Disp: 90 tablet, Rfl: 1    pantoprazole (PROTONIX) 40 mg tablet, Take 1 tablet (40 mg total) by mouth daily, Disp: 90 tablet, Rfl: 1  Patient Active Problem List    Diagnosis Date Noted    Elevated liver function tests 2023    Mixed hyperlipidemia 2023    Environmental allergies 2023    Functional diarrhea 10/22/2021    Essential hypertension 2021       Allergies   Allergen Reactions    Shrimp Extract Allergy Skin Test - Food Allergy Anaphylaxis     itchy    Shrimp Flavor - Food Allergy Anaphylaxis     itchy    Bee Pollen Hives     Weather change     Pollen Extract Allergic Rhinitis     Weather change        OB History    Para Term  AB Living   3 2 0 0 1 2   SAB IAB Ectopic Multiple Live Births   0 0 0 0 2      # Outcome Date GA Lbr Kian/2nd Weight Sex Type Anes PTL Lv   3 Para            2 Para            1 AB               Obstetric Comments   2 vaginal deliveries        Vitals:    24 1257   BP: 126/80   BP Location: Left arm   Patient Position: Sitting   Cuff Size: Large   Height: 5' (1.524 m)     Body mass index is 26.44 kg/m².    Review of Systems     Constitutional: Negative for chills, fatigue, fever, headaches, visual disturbances, and unexpected weight change.   Respiratory: Negative for cough, & shortness of breath.  Cardiovascular: Negative for chest pain. .    Gastrointestinal: Negative for Abd pain, nausea & vomiting, constipation and diarrhea.   Genitourinary: Negative for difficulty urinating, dysuria, hematuria,  unusual vaginal bleeding or discharge  Skin: Negative skin changes    Physical Exam     Constitutional: Alert & Oriented x3, well-developed and well-nourished. No distress.   HENT: Atraumatic, Normocephalic, Conjunctivae clear  Neck:  Normal range of motion. Neck supple. No thyromegaly, mass, nodules or tenderness  Pulmonary: Effort normal.   Abdominal: Soft. No tenderness or masses  Musculoskeletal: Normal ROM  Skin: Warm & Dry  Psychological: Normal mood, thought content, behavior & judgement     Breasts:   Right: tissue soft without masses, tenderness, skin changes or nipple discharge. No areas of erythema or pain. No subclavicular, axillary, pectoral adenopathy  Left:  tissue soft without masses, tenderness, skin changes or nipple discharge. No areas of erythema or pain. No subclavicular, axillary, pectoral adenopathy    Pelvic exam was performed with patient supine, lithotomy position.      Labia: Negative rash, tenderness, lesion or injury on the right labia.              Negative rash, tenderness, lesion or injury on the left labia.   Urethral meatus:  Negative for  tenderness, inflammation or discharge.   Uterus: not deviated, enlarged, fixed or tender.   Cervix: No CMT, no discharge or friability.   Right adnexa: no mass, no tenderness and no fullness.  Left adnexa: no mass, no tenderness and no fullness.   Vagina: No erythema, tenderness, masses, or foreign body in the vagina. No signs of injury around the vagina. No unusual vaginal discharge   Perineum without lesions, signs of injury, erythema or swelling.  Inguinal Canal:        Right: No inguinal adenopathy or hernia present.        Left: No inguinal adenopathy or hernia present.     OBGyn Exam

## 2024-07-03 NOTE — PATIENT INSTRUCTIONS
Patient Education     Lowering Your Risk of Breast Cancer   About this topic   Breast cancer is a serious illness. Breast cancer is when abnormal cells grow and divide more quickly in your breast. These cells form a growth or tumor. The abnormal cells may enter nearby tissue and spread to other parts of the body. It is the type of cancer most often seen in women. Men can have breast cancer, but it is a rare condition.  General   Some things in your life may increase your risk of breast cancer. You may not be able to change some of these. Others you can control.  You are more likely to get breast cancer if you:  Have a mother, sister, or daughter who has had breast cancer  Have used hormones for menopause for more than 5 years  Have had radiation therapy to the breast or chest in the past  Are overweight or do not exercise  Had your first menstrual period before you were 12 years old  Went through menopause after age 56  Have never been pregnant or had your first child after age 36  Have had breast cancer before  Drink alcohol in any form  Have dense breasts  Are older in age  There is no certain way to prevent breast cancer. There are things you can do to lower your chances of having breast cancer.  Keep a healthy weight. Lose weight if you are overweight. Being overweight raises your chances of having breast cancer.  Eat a healthy diet to maintain a healthy weight, such as more fruits, vegetables, and lean cuts of meat. Decrease the amount of saturated fat in your diet.  Exercise. Being active helps you keep a healthy weight.  Limit your alcohol intake or do not drink alcohol. The more alcohol you drink, the higher your risk.  Do not smoke cigarettes. Smoking can increase your risk of many types of cancer.  Breastfeed your baby. This may help protect you. The longer you breastfeed, the more protection you have.  Talk with your doctor about:  Limiting or stopping hormone therapy.  Taking certain drugs to prevent  breast cancer. For women at high risk of having breast cancer, there are a few drugs that may lower your risk.  Surgery to prevent you from having breast cancer if you are very high risk.  When do I need to call the doctor?   Changes in your breasts  A lump or area in your breast that feels different  Discharge from your nipple  Skin on your breast is dimpled or indented  You have questions or concerns about your breasts  Helpful tips   Talk to your doctor about the best kind of breast cancer screening for you.  If you want to do self breast exams, have your doctor show you the right way to do them.  Tell your doctor of any abnormal finding.  Last Reviewed Date   2021-10-04  Consumer Information Use and Disclaimer   This generalized information is a limited summary of diagnosis, treatment, and/or medication information. It is not meant to be comprehensive and should be used as a tool to help the user understand and/or assess potential diagnostic and treatment options. It does NOT include all information about conditions, treatments, medications, side effects, or risks that may apply to a specific patient. It is not intended to be medical advice or a substitute for the medical advice, diagnosis, or treatment of a health care provider based on the health care provider's examination and assessment of a patient’s specific and unique circumstances. Patients must speak with a health care provider for complete information about their health, medical questions, and treatment options, including any risks or benefits regarding use of medications. This information does not endorse any treatments or medications as safe, effective, or approved for treating a specific patient. UpToDate, Inc. and its affiliates disclaim any warranty or liability relating to this information or the use thereof. The use of this information is governed by the Terms of Use, available at  https://www.[employer].com/en/know/clinical-effectiveness-terms   Copyright   Copyright © 2024 UpToDate, Inc. and its affiliates and/or licensors. All rights reserved.       Perineal Hygiene      Your vaginal naturally takes care of its self, it is a self washing system, the less you mess the healthier it will be     No soaps or feminine wash to the vulva, these products can cause dermitis, bacterial infections and other vulvar problems.   Use only water to cleanse, or water with Dove or Dove Sensitive Skin Bar soap if necessary.    No scented lotions or products are advised in or near your vulva.    Use only coconut oil for moisture if needed.  No douching this may cause imbalance in your vaginal PH and further issues.    If you wear panty liners, you may apply a thin coating of Vaseline, A&D ointment or coconut oil to the vulvar tissues as a skin barrier     Cotton underware, loose fitting clothing  Only perfume-free, dye-free laundry detergent, use a second rinse cycle   Avoid fabric softeners/dryer sheets.       Your partner should avoid the same products as well.       Over the counter probiotic to restore vaginal naga may be helpful as well, take daily.       You may also look into Boric Acid vaginal suppositories to restore vaginal PH balance for up to 2 weeks as directed on the box. You may not use these if you are pregnant      For vaginal dryness:      You may use:     Coconut oil (organic, pure, unscented) as needed for moisture or lubrication. ( Do not use if allergic)       Replens moisture restore external comfort gel daily ( use as directed on the box)        Replens long lasting vaginal moisturizer  ( use as directed on the box)         For Vaginal Lubrication:          You may use:     Coconut oil (organic, pure, unscented) as a lubricant or another scent-free lubricant (Astroglide, Uberlube) if needed.  Do not use coconut oil or silicone if using a condom as this may break down the integrity of  the condom and cause an unplanned pregnancy              Do not use coconut oil if allergic               Replens silky smooth lubricant, premium silicone based lubricant for intercourse. ( use as directed, a small amount will provide an enhanced natural feeling)     Any premium over the counter vaginal lubricant water or silicone based. Silicone based will have more staying power.     Patient Education     Pelvic Floor Exercises   About this topic   The pelvic floor consists of muscles and strong bands of tissues which support all of the organs in your pelvis. Some of these organs are the bladder and the small and large bowel as well as the womb and the prostate. If the muscles and tissues get weak, your organs may drop. This can lead to other problems. Your urine may leak when you laugh, sneeze, or cough. You may not be able to drain the bladder fully. You may have less problems if you do exercises to strengthen your pelvic floor and abdominal muscles.  Kegel exercises help make the muscles in the pelvic floor stronger. Anyone can do them. It is also important to make your abdominal muscles stronger. In order for these exercises to work, you must be consistent when doing them.  General   Before starting with a program, ask your doctor if you are healthy enough to do these exercises. Your doctor may have you work with a  or physical therapist to make a safe exercise program to meet your needs.  Strengthening Exercises   Kegel exercises keep your pelvic muscles firm and strong. You can do these in many different positions. Start by lying down with your knees bent and feet on the bed. Squeeze the pelvic muscles as if you are trying to stop the flow of urine. Hold these muscles for a count of 3, and then slowly relax them for a count of 3. Try to work up to squeezing for a count of 10 and slowly relaxing for a count of 10. Increase the muscle squeeze until you get to 10. When relaxing, slowly relax to a count  of 10.  Breathe out when you are squeezing and breathe in when you are relaxing. Your goal is to try to do 10 Kegels 3 or more times each day. Take time to rest between sets. Be sure to only contract your pelvic floor muscles, not your buttocks, thighs, or abdominal muscles.  Pelvic floor contractions ? There are a few ways to feel the pelvic muscles contract.  When you are passing urine, try suddenly stopping your flow of urine. Do not do this on a regular basis, but only to feel what the contraction feels like. Doing this while passing urine can lead to other problems.  Put a finger into the vagina or rectum. Contract the muscles around your finger as if you were trying to stop the flow of urine or stop the passing of gas.  Place two chairs without arms about 2 inches (5 cm) apart. Sit so you have one butt cheek on each chair. Now, try yg your pelvic floor muscles. This will help you keep from using other muscles.  Pelvic tilts ? Lie on your back with your knees bent and feet flat on the floor. Tighten your stomach muscles and press your lower back down to the floor. Try doing Kegels with this exercise when your back is flattened. Hold 3 to 5 seconds. Relax.  Straight leg raises lying down ? Lie on your back with one leg straight. Bend your other knee so the foot is flat on the bed. Keeping your leg straight, lift the leg up to the level of your other knee. Lower it back down. Repeat with the other leg.  Hip lifts ? Lie on your back with your knees bent and feet flat on the floor. Tighten your stomach muscles and lift your buttocks off the floor. Try doing Kegels when up in this position. Hold 3 to 5 seconds. Relax.  Abdominal bracing ? Do this exercise in different positions: Lying down, sitting, and standing. Tighten your stomach muscles. While keeping the stomach muscles tight, tighten your pelvic floor muscles. Now, forcefully laugh or cough to see if you were able to prevent urine from  leaking.  Abdominal crunches ? Lie on your back with both knees bent. Keep your feet flat on the floor. Place your hands in one of these positions. Try starting with the first position since it is the easiest. As you get better, use the other positions to make it harder.  Crunches with arms at sides.  Crunches with arms across chest.  Crunches with arms behind head. Be careful not to interlock your fingers behind your neck or head while doing crunches. This may add tension to your neck and cause strain.  Look at the ceiling. Tighten your belly muscles and lift your shoulders and upper back off the floor. Breathe out while you are doing this. Lower your shoulders to the floor. Breathe in while you are doing this. Relax your belly muscles all the way before starting another crunch.             What will the results be?   Less leakage of urine when you cough, sneeze, laugh, or run  Fewer strong urges to pass urine  Fewer trips to the bathroom each day  Less risk of organs, such as the uterus or bladder, dropping into the vagina  Faster recovery after childbirth or prostate surgery  Stronger core muscles  Increased sensitivity during sex  Helpful tips   You can also try doing a different kind of Kegels. Do 5 quick, strong pelvic floor contractions. Sometimes, if you have an urge to pass urine but are not near a bathroom, you can do this kind of Kegel to calm the urge.  Stay active and work out to keep your muscles strong and flexible.  Keep a healthy weight to avoid putting too much stress on your spine. Eat a healthy diet to keep your muscles healthy.  Be sure you do not hold your breath when exercising. This can raise your blood pressure. If you tend to hold your breath, try counting out loud when exercising. If any exercise bothers you, stop right away.  Try walking or cycling at an easy pace for a few minutes to warm up your muscles. Do this again after exercising.  Doing exercises before a meal may be a good way to  get into a routine. A good time to do these exercises is each time you are stopped at a stop light while driving.  Exercise may be slightly uncomfortable, but you should not have sharp pains. If you do get sharp pains, stop what you are doing. If the sharp pains continue, call your doctor.  Last Reviewed Date   2021-03-31  Consumer Information Use and Disclaimer   This generalized information is a limited summary of diagnosis, treatment, and/or medication information. It is not meant to be comprehensive and should be used as a tool to help the user understand and/or assess potential diagnostic and treatment options. It does NOT include all information about conditions, treatments, medications, side effects, or risks that may apply to a specific patient. It is not intended to be medical advice or a substitute for the medical advice, diagnosis, or treatment of a health care provider based on the health care provider's examination and assessment of a patient’s specific and unique circumstances. Patients must speak with a health care provider for complete information about their health, medical questions, and treatment options, including any risks or benefits regarding use of medications. This information does not endorse any treatments or medications as safe, effective, or approved for treating a specific patient. UpToDate, Inc. and its affiliates disclaim any warranty or liability relating to this information or the use thereof. The use of this information is governed by the Terms of Use, available at https://www.[employer].com/en/know/clinical-effectiveness-terms   Copyright   Copyright © 2024 UpToDate, Inc. and its affiliates and/or licensors. All rights reserved.      Patient Education     Menopause   The Basics   Written by the doctors and editors at [facility]   What is menopause? -- Menopause is the time in life when monthly periods naturally stop. At this time, the ovaries stop releasing eggs and stop making  "the hormones estrogen and progesterone.  Menopause usually occurs between the ages of 45 and 55. The average age is 51.  Menopause does not happen all at once. It is a \"transition\" that takes years. It can cause symptoms that are difficult for a lot of people. But there are treatments that can help.  How do I know if I am going through menopause? -- You might wonder about menopause when your periods start to change. If you are going through menopause, you might:   Have periods more or less often than usual (for example, every 5 to 6 weeks instead of every 4)   Have shorter periods than before   Skip 1 or more periods   Have symptoms like hot flashes  If you have had a hysterectomy (surgery to remove your uterus), but you still have your ovaries, it might be tough to tell when you are going through menopause. Still, you can have menopause symptoms even if you no longer have a uterus.  If your ovaries were removed before the usual age of menopause, you had what doctors call \"surgical menopause.\" That just means that you went through it early, because your ovaries were removed.  What are the symptoms of menopause? -- Some people go through menopause without symptoms. But most have 1 or more of these symptoms:   Hot flashes - Hot flashes feel like a wave of heat that starts in your chest and face and then moves through your body. Hot flashes usually start happening before you stop having periods.   Night sweats - When hot flashes happen during sleep, they are called \"night sweats.\" They can make it hard to get a good night's sleep.   Sleep problems - During the transition to menopause, some people have trouble falling or staying asleep. This can happen even if night sweats are not a problem.   Vaginal dryness - Menopause can cause the vagina and tissues near the vagina to become dry and thin. This usually starts a few years after menopause. It can be uncomfortable or make sex painful.   Depression - During the " "transition to menopause, many people start having symptoms of depression or anxiety. This is more likely if you have had depression before. Depression symptoms include:   Sadness   Losing interest in doing things   Sleeping too much or too little   Trouble concentrating or remembering things - This might be caused by lack of sleep that often happens at menopause, or by the lack of estrogen. Some experts suspect that estrogen is important for good brain function.   Headaches - If you get migraine headaches related to your period, these might get worse during menopause.   Joint pain - Some people have joint aches or pains during and after menopause.  Many of these symptoms get better after menopause. But some people continue to have hot flashes, even for years afterwards.  Should I see a doctor or nurse? -- It depends. If your periods start changing and you are 45 or older, you do not need to see your doctor for this reason alone. But you should tell them if you have symptoms that bother you.  For example, see your doctor if you cannot sleep because of night sweats, if it is hard to work because of your hot flashes, or if you feel sad or down and don't seem to enjoy things anymore. If your regular doctor cannot recommend treatments that can help, you might consider looking for a doctor who is a specialist in menopause or women's health. They might have more information about ways to relieve symptoms.  You should also see a doctor or nurse if you:   Have your period more often than every 3 weeks   Have very heavy bleeding during your period   Have bleeding or \"spotting\" between periods   Have been through menopause (have gone 12 months without a period) and start bleeding again, even if it's just a spot of blood  Is there a test for menopause? -- There is a test that can help tell if you are going through menopause. But doctors usually use this only in people who are too young to be in menopause or who have special " circumstances.  Can I still get pregnant? -- As long as you are still having periods, even if they do not happen often, it is possible to get pregnant. If you have sex and do not want to get pregnant, use some form of birth control.  If you have not had a period for a full year, it is probably safe to say you have been through menopause and can no longer get pregnant.  How are the symptoms of menopause treated? -- There are treatments that can help relieve symptoms.  Treatments for hot flashes include:   Hormone therapy - The hormone estrogen is the most effective treatment for menopause symptoms. Most people need to take estrogen with another hormone, called progesterone. People who have had a hysterectomy (surgery to remove the uterus) can take estrogen by itself. Experts think that these hormones are effective and safe for most people.  If you want to take hormones, ask your doctor or nurse if it is an option for you. You should not take hormones if you have had breast cancer, a heart attack, a stroke, or a blood clot.   Antidepressants - Some types of antidepressants can ease hot flashes and depression. Even if you do not have depression, these medicines can still help with hot flashes. They are often used by people who have had breast cancer and cannot take estrogen.   Anti-seizure medicine - One of the medicines used to prevent seizures seems to help some people with hot flashes, even if they do not have seizures.  Treatments for vaginal dryness include:   Vaginal estrogen - Vaginal estrogen is any form of estrogen that goes directly into the vagina. It comes in creams, tablets, or a flexible ring. Vaginal estrogen comes in small doses that don't increase the levels of estrogen in other parts of the body very much.   Other medicines - In most cases, doctors recommend vaginal estrogen. That's because there is more evidence that it helps with vaginal dryness compared with other medicines. But if you do not want  "to use vaginal estrogen, your doctor can suggest other options. For example:   You might choose to use a vaginal moisturizer several times a week. Vaginal moisturizers (sample brand names: Replens, K-Y SILK-E) do not contain hormones. They help keep the vagina moist all of the time. You can also add a lubricant (sample brand names: Astroglide, K-Y Jelly) when you have sex.   In some cases, doctors might suggest another medicine. For example, there is a tablet that you insert into the vagina once a day. There is also a pill that might help some people who cannot use vaginal products.  Some doctors are not used to prescribing hormone therapy for menopause. If you feel that you are not getting the help you need, you might choose to talk to a different doctor who is an expert in menopause treatment. You can ask your doctor or nurse to refer you to someone.  Can I do anything on my own to reduce the symptoms of menopause? -- Yes. There are some steps you can try (table 1). But ask your doctor before you take any \"natural remedies,\" especially if you have a history of breast cancer. Things like herbs and supplements are not proven to help, and in some cases, could harm you.  What can I do to protect my bones? -- You can:   Take calcium and vitamin D supplements.   Be active (physical activity helps keep bones strong).   Ask your doctor when you should start having bone density tests.  If needed, your doctor can prescribe medicines to help keep your bones strong.  All topics are updated as new evidence becomes available and our peer review process is complete.  This topic retrieved from [employer] on: Feb 26, 2024.  Topic 31675 Version 11.0  Release: 32.2.4 - C32.56  © 2024 UpToDate, Inc. and/or its affiliates. All rights reserved.  table 1: Ways to cope with menopause symptoms  Symptom  What you can do    Hot flashes and night sweats Dress in layers so you can take off clothes if you get hot.    Keep your home at a " comfortable temperature. Avoid hot drinks, such as coffee and tea.    Put a cold, wet washcloth against your neck during hot flashes.    Quit smoking, if you smoke. (Smoking makes hot flashes worse.) If you are having trouble quitting, your doctor or nurse can help.   Vaginal dryness Use a vaginal moisturizer a few times a week (sample brand names: Replens, K-Y SILK-E).    Use a lubricant before sex (sample brand names: Astroglide, K-Y Jelly).   Sleep problems Try to go to sleep and get up at the same time every day, even when you don't sleep well.    Avoid caffeine in the afternoon, and limit alcohol.   Depression Try to stay active. Exercise can help your mood.    Seek support from other people going through menopause.   Graphic 31429 Version 4.0  Consumer Information Use and Disclaimer   Disclaimer: This generalized information is a limited summary of diagnosis, treatment, and/or medication information. It is not meant to be comprehensive and should be used as a tool to help the user understand and/or assess potential diagnostic and treatment options. It does NOT include all information about conditions, treatments, medications, side effects, or risks that may apply to a specific patient. It is not intended to be medical advice or a substitute for the medical advice, diagnosis, or treatment of a health care provider based on the health care provider's examination and assessment of a patient's specific and unique circumstances. Patients must speak with a health care provider for complete information about their health, medical questions, and treatment options, including any risks or benefits regarding use of medications. This information does not endorse any treatments or medications as safe, effective, or approved for treating a specific patient. UpToDate, Inc. and its affiliates disclaim any warranty or liability relating to this information or the use thereof.The use of this information is governed by the Terms  of Use, available at https://www.[employer]uwer.com/en/know/clinical-effectiveness-terms. 2024© [employer] and its affiliates and/or licensors. All rights reserved.  Copyright   © 2024 [employer] and/or its affiliates. All rights reserved.

## 2024-07-05 ENCOUNTER — OFFICE VISIT (OUTPATIENT)
Dept: OBGYN CLINIC | Facility: CLINIC | Age: 54
End: 2024-07-05
Payer: COMMERCIAL

## 2024-07-05 VITALS — SYSTOLIC BLOOD PRESSURE: 126 MMHG | DIASTOLIC BLOOD PRESSURE: 80 MMHG | BODY MASS INDEX: 26.44 KG/M2 | HEIGHT: 60 IN

## 2024-07-05 DIAGNOSIS — Z12.31 ENCOUNTER FOR SCREENING MAMMOGRAM FOR MALIGNANT NEOPLASM OF BREAST: ICD-10-CM

## 2024-07-05 DIAGNOSIS — Z11.3 SCREENING FOR STDS (SEXUALLY TRANSMITTED DISEASES): ICD-10-CM

## 2024-07-05 DIAGNOSIS — Z01.419 ENCOUNTER FOR GYNECOLOGICAL EXAMINATION WITHOUT ABNORMAL FINDING: Primary | ICD-10-CM

## 2024-07-05 PROCEDURE — 99386 PREV VISIT NEW AGE 40-64: CPT | Performed by: OBSTETRICS & GYNECOLOGY

## 2024-07-05 PROCEDURE — 87591 N.GONORRHOEAE DNA AMP PROB: CPT | Performed by: OBSTETRICS & GYNECOLOGY

## 2024-07-05 PROCEDURE — 87491 CHLMYD TRACH DNA AMP PROBE: CPT | Performed by: OBSTETRICS & GYNECOLOGY

## 2024-07-08 LAB
C TRACH DNA SPEC QL NAA+PROBE: NEGATIVE
N GONORRHOEA DNA SPEC QL NAA+PROBE: NEGATIVE

## 2024-07-12 ENCOUNTER — TELEPHONE (OUTPATIENT)
Age: 54
End: 2024-07-12

## 2024-07-12 DIAGNOSIS — E03.9 HYPOTHYROIDISM, UNSPECIFIED TYPE: Primary | ICD-10-CM

## 2024-07-12 RX ORDER — LEVOTHYROXINE SODIUM 0.03 MG/1
25 TABLET ORAL
Qty: 100 TABLET | Refills: 3 | Status: SHIPPED | OUTPATIENT
Start: 2024-07-12

## 2024-07-12 NOTE — TELEPHONE ENCOUNTER
Labs reviewed with pt from June. She is requesting to start levothyroxine as she is having the symptoms that you listed. Please send to [facility].

## 2024-07-29 ENCOUNTER — TELEPHONE (OUTPATIENT)
Dept: NEPHROLOGY | Facility: CLINIC | Age: 54
End: 2024-07-29

## 2024-07-29 DIAGNOSIS — R80.9 PROTEINURIA, UNSPECIFIED TYPE: Primary | ICD-10-CM

## 2024-07-29 NOTE — TELEPHONE ENCOUNTER
Called spoke with patient advised patient to get non-fasting labs done 1 week prior to 08/15/24 scheduled consult appointment with Dr.Adeem Mega MD. patient verbalized understanding and is okay with it.   Lab orders faxed to patient fax # [phone].

## 2024-08-01 DIAGNOSIS — I10 ESSENTIAL HYPERTENSION: ICD-10-CM

## 2024-08-01 RX ORDER — AMLODIPINE AND BENAZEPRIL HYDROCHLORIDE 10; 40 MG/1; MG/1
1 CAPSULE ORAL DAILY
Qty: 90 CAPSULE | Refills: 0 | Status: SHIPPED | OUTPATIENT
Start: 2024-08-01

## 2024-08-01 NOTE — TELEPHONE ENCOUNTER
Reason for call:   [x] Refill   [] Prior Auth  [] Other:     Office:   [x] PCP/Provider -   [] Specialty/Provider -     Medication: amLODIPine-benazepril (LOTREL) 10-40 MG per capsule Take 1 capsule by mouth daily,       Pharmacy: [facility]/pharmacy #[id number] - [place], [place] - [address],     Does the patient have enough for 3 days?   [x] Yes   [] No - Send as HP to POD

## 2024-08-02 LAB
ALBUMIN SERPL-MCNC: 4.6 G/DL (ref 3.8–4.9)
ALP SERPL-CCNC: 92 IU/L (ref 44–121)
ALT SERPL-CCNC: 42 IU/L (ref 0–32)
AST SERPL-CCNC: 26 IU/L (ref 0–40)
BASOPHILS # BLD AUTO: 0.1 X10E3/UL (ref 0–0.2)
BASOPHILS NFR BLD AUTO: 1 %
BILIRUB SERPL-MCNC: 0.6 MG/DL (ref 0–1.2)
BUN SERPL-MCNC: 23 MG/DL (ref 6–24)
BUN/CREAT SERPL: 23 (ref 9–23)
CALCIUM SERPL-MCNC: 9.9 MG/DL (ref 8.7–10.2)
CHLORIDE SERPL-SCNC: 102 MMOL/L (ref 96–106)
CO2 SERPL-SCNC: 23 MMOL/L (ref 20–29)
CREAT SERPL-MCNC: 1.01 MG/DL (ref 0.57–1)
CREAT UR-MCNC: 211.2 MG/DL
EGFR: 67 ML/MIN/1.73
EOSINOPHIL # BLD AUTO: 0.4 X10E3/UL (ref 0–0.4)
EOSINOPHIL NFR BLD AUTO: 6 %
ERYTHROCYTE [DISTWIDTH] IN BLOOD BY AUTOMATED COUNT: 13.5 % (ref 11.7–15.4)
GLOBULIN SER-MCNC: 3.1 G/DL (ref 1.5–4.5)
GLUCOSE SERPL-MCNC: 139 MG/DL (ref 70–99)
HCT VFR BLD AUTO: 42 % (ref 34–46.6)
HGB BLD-MCNC: 13.9 G/DL (ref 11.1–15.9)
IMM GRANULOCYTES # BLD: 0 X10E3/UL (ref 0–0.1)
IMM GRANULOCYTES NFR BLD: 0 %
LYMPHOCYTES # BLD AUTO: 1.7 X10E3/UL (ref 0.7–3.1)
LYMPHOCYTES NFR BLD AUTO: 28 %
MCH RBC QN AUTO: 29.8 PG (ref 26.6–33)
MCHC RBC AUTO-ENTMCNC: 33.1 G/DL (ref 31.5–35.7)
MCV RBC AUTO: 90 FL (ref 79–97)
MONOCYTES # BLD AUTO: 0.4 X10E3/UL (ref 0.1–0.9)
MONOCYTES NFR BLD AUTO: 6 %
NEUTROPHILS # BLD AUTO: 3.5 X10E3/UL (ref 1.4–7)
NEUTROPHILS NFR BLD AUTO: 59 %
PLATELET # BLD AUTO: 246 X10E3/UL (ref 150–450)
POTASSIUM SERPL-SCNC: 3.9 MMOL/L (ref 3.5–5.2)
PROT SERPL-MCNC: 7.7 G/DL (ref 6–8.5)
PROT UR-MCNC: 109.6 MG/DL
PROT/CREAT UR: 519 MG/G CREAT (ref 0–200)
RBC # BLD AUTO: 4.66 X10E6/UL (ref 3.77–5.28)
SODIUM SERPL-SCNC: 140 MMOL/L (ref 134–144)
WBC # BLD AUTO: 6.1 X10E3/UL (ref 3.4–10.8)

## 2024-08-06 ENCOUNTER — TELEPHONE (OUTPATIENT)
Dept: OBGYN CLINIC | Facility: CLINIC | Age: 54
End: 2024-08-06

## 2024-08-06 NOTE — TELEPHONE ENCOUNTER
Called patient to let her know GC and chlamydia came back normal. Patient understood and was thankful for the call.

## 2024-08-15 ENCOUNTER — CONSULT (OUTPATIENT)
Dept: NEPHROLOGY | Facility: CLINIC | Age: 54
End: 2024-08-15

## 2024-08-15 VITALS
TEMPERATURE: 97.5 F | HEART RATE: 88 BPM | RESPIRATION RATE: 16 BRPM | OXYGEN SATURATION: 98 % | SYSTOLIC BLOOD PRESSURE: 126 MMHG | DIASTOLIC BLOOD PRESSURE: 84 MMHG | BODY MASS INDEX: 26.31 KG/M2 | WEIGHT: 134 LBS | HEIGHT: 60 IN

## 2024-08-15 DIAGNOSIS — R80.9 PROTEINURIA, UNSPECIFIED TYPE: ICD-10-CM

## 2024-08-15 RX ORDER — CHLORAL HYDRATE 500 MG
2000 CAPSULE ORAL DAILY
COMMUNITY

## 2024-08-15 NOTE — LETTER
August 15, 2024     MARIANO Mejia  [address] [place] [id number]    Patient: Bing Levy   YOB: 1970   Date of Visit: 8/15/2024       Dear Dr. Campbell:    Thank you for referring Bing Levy to me for evaluation. Below are my notes for this consultation.    If you have questions, please do not hesitate to call me. I look forward to following your patient along with you.         Sincerely,        Lary Ayoub MD        CC: No Recipients    Lary Ayoub MD  8/15/2024  4:55 PM  Incomplete  NEPHROLOGY OFFICE CONSULT  Bing Levy 54 y.o. female MRN: [id number]    Encounter: [id number] DATE: 8/15/2024    REASON FOR VISIT: Bing Levy is a 54 y.o.female who was referred by MARIANO Mejia for evaluation..    HPI:    This is a 54 years old female with longstanding history of proteinuria as early as age 21, development of hypertension several years ago, hypothyroidism, asthma, fatty liver who was referred to renal office by her PCP due to having history of proteinuria.  Patient was followed by an outside nephrologist for proteinuria with thoughts of patient having proteinuria secondary to hypertension versus IgA nephropathy.  A renal biopsy was offered last year.  Patient had been on high-dose amlodipine/benazepril and had noted reduction in proteinuria.  She denies having lower extremity edema.  Admits to significant improvement in blood pressure reading.  Patient denies using NSAIDs or high-dose acetaminophen in recent times.  She was found to have diagnosis of transaminitis as well as fatty liver after which she stopped taking acetaminophen.          PAST MEDICAL HISTORY:  Past Medical History:   Diagnosis Date   • Hepatitis C    • Hypertension        PAST SURGICAL HISTORY:  Past Surgical History:   Procedure Laterality Date   • CHOLECYSTECTOMY         SOCIAL HISTORY:  Social History     Substance and Sexual Activity   Alcohol Use Yes    Comment: social drinker      Social History     Substance and  Sexual Activity   Drug Use Never     Social History     Tobacco Use   Smoking Status Never   Smokeless Tobacco Never       FAMILY HISTORY:  Family History   Problem Relation Age of Onset   • Stomach cancer Mother    • Asthma Mother    • Hypertension Father    • Breast cancer Cousin        ALLERGY:  Allergies   Allergen Reactions   • Shrimp Extract Allergy Skin Test - Food Allergy Anaphylaxis     itchy   • Shrimp Flavor - Food Allergy Anaphylaxis     itchy   • Bee Pollen Hives     Weather change    • Pollen Extract Allergic Rhinitis     Weather change        MEDICATIONS:    Current Outpatient Medications:   •  acetaminophen (TYLENOL) 500 mg tablet, Take 500-1,000 mg by mouth, Disp: , Rfl:   •  amLODIPine-benazepril (LOTREL) 10-40 MG per capsule, Take 1 capsule by mouth daily, Disp: 90 capsule, Rfl: 0  •  eszopiclone (LUNESTA) 2 mg tablet, Take 1 tablet (2 mg total) by mouth daily at bedtime as needed for sleep Take immediately before bedtime, Disp: 30 tablet, Rfl: 0  •  levothyroxine 25 mcg tablet, Take 1 tablet (25 mcg total) by mouth daily in the early morning, Disp: 100 tablet, Rfl: 3  •  montelukast (SINGULAIR) 10 mg tablet, TAKE 1 TABLET BY MOUTH DAILY AT BEDTIME (Patient taking differently: Take 10 mg by mouth as needed), Disp: 90 tablet, Rfl: 1  •  Omega-3 Fatty Acids (fish oil) 1,000 mg, Take 2,000 mg by mouth daily, Disp: , Rfl:   •  pantoprazole (PROTONIX) 40 mg tablet, Take 1 tablet (40 mg total) by mouth daily (Patient not taking: Reported on 8/15/2024), Disp: 90 tablet, Rfl: 1    REVIEW OF SYSTEMS:    Review of Systems   Constitutional: Negative.    HENT: Negative.     Eyes: Negative.    Respiratory: Negative.     Cardiovascular: Negative.    Gastrointestinal: Negative.    Endocrine: Negative.    Genitourinary: Negative.    Musculoskeletal: Negative.    Skin: Negative.    Allergic/Immunologic: Negative.    Neurological: Negative.    Hematological: Negative.    All other systems reviewed and are  negative.      PHYSICAL EXAM:  Vitals:    08/15/24 1524   BP: 126/84   Pulse: 88   Resp: 16   Temp: 97.5 °F (36.4 °C)   TempSrc: Temporal   SpO2: 98%   Weight: 60.8 kg (134 lb)   Height: 5' (1.524 m)     Body mass index is 26.17 kg/m².    Physical Exam  Constitutional:       Appearance: She is well-developed.   HENT:      Head: Normocephalic and atraumatic.   Eyes:      Pupils: Pupils are equal, round, and reactive to light.   Cardiovascular:      Rate and Rhythm: Normal rate and regular rhythm.      Heart sounds: Normal heart sounds.   Pulmonary:      Effort: Pulmonary effort is normal.   Abdominal:      General: Bowel sounds are normal.      Palpations: Abdomen is soft.   Musculoskeletal:         General: Normal range of motion.      Cervical back: Neck supple.   Skin:     General: Skin is warm.   Neurological:      Mental Status: She is alert and oriented to person, place, and time.         LAB RESULTS:  Results for orders placed or performed in visit on 07/31/24   Naomi Melo Default   Result Value Ref Range    White Blood Cell Count 6.1 3.4 - 10.8 x10E3/uL    Red Blood Cell Count 4.66 3.77 - 5.28 x10E6/uL    Hemoglobin 13.9 11.1 - 15.9 g/dL    HCT 42.0 34.0 - 46.6 %    MCV 90 79 - 97 fL    MCH 29.8 26.6 - 33.0 pg    MCHC 33.1 31.5 - 35.7 g/dL    RDW 13.5 11.7 - 15.4 %    Platelet Count 246 150 - 450 x10E3/uL    Neutrophils 59 Not Estab. %    Lymphocytes 28 Not Estab. %    Monocytes 6 Not Estab. %    Eosinophils 6 Not Estab. %    Basophils PCT 1 Not Estab. %    Neutrophils (Absolute) 3.5 1.4 - 7.0 x10E3/uL    Lymphocytes (Absolute) 1.7 0.7 - 3.1 x10E3/uL    Monocytes (Absolute) 0.4 0.1 - 0.9 x10E3/uL    Eosinophils (Absolute) 0.4 0.0 - 0.4 x10E3/uL    Basophils ABS 0.1 0.0 - 0.2 x10E3/uL    Immature Granulocytes 0 Not Estab. %    Immature Granulocytes (Absolute) 0.0 0.0 - 0.1 x10E3/uL   Comprehensive metabolic panel   Result Value Ref Range    Glucose, Random 139 (H) 70 - 99 mg/dL    BUN 23 6 - 24 mg/dL     Creatinine 1.01 (H) 0.57 - 1.00 mg/dL    eGFR 67 >59 mL/min/1.73    SL AMB BUN/CREATININE RATIO 23 9 - 23    Sodium 140 134 - 144 mmol/L    Potassium 3.9 3.5 - 5.2 mmol/L    Chloride 102 96 - 106 mmol/L    CO2 23 20 - 29 mmol/L    CALCIUM 9.9 8.7 - 10.2 mg/dL    Protein, Total 7.7 6.0 - 8.5 g/dL    Albumin 4.6 3.8 - 4.9 g/dL    Globulin, Total 3.1 1.5 - 4.5 g/dL    TOTAL BILIRUBIN 0.6 0.0 - 1.2 mg/dL    Alk Phos Isoenzymes 92 44 - 121 IU/L    AST 26 0 - 40 IU/L    ALT 42 (H) 0 - 32 IU/L   Protein / creatinine ratio, urine   Result Value Ref Range    Creatinine, Urine 211.2 Not Estab. mg/dL    Total Protein, Urine 109.6 Not Estab. mg/dL    Prot/Creat Ratio, Ur 519 (H) 0 - 200 mg/g creat         ASSESSMENT and PLAN:  Bing was seen today for consult and proteinuria.    Diagnoses and all orders for this visit:    Proteinuria, unspecified type  -     Ambulatory Referral to Nephrology  -     FEDERICO w/Reflex; Future  -     ANCA Screen With MPO and PR3 With Reflex To ANCA Titer; Future  -     Anti-DNA antibody, double-stranded; Future  -     C3 complement; Future  -     C4 complement; Future  -     Cryoglobulin; Future  -     Hepatitis B core antibody, total; Future  -     Hepatitis B surface antibody; Future  -     Hepatitis B surface antigen; Future  -     Hepatitis C antibody; Future  -     Immunoglobulin free LT chains blood; Future  -     Protein electrophoresis, serum; Future  -     Albumin; Standing  -     Albumin / creatinine urine ratio; Standing  -     Calcium; Standing  -     CBC and differential; Standing  -     Comprehensive metabolic panel; Standing  -     Phosphorus; Standing  -     Urinalysis with microscopic; Future  -     PTH, intact; Standing  -     Vitamin D 25 hydroxy; Standing      54 years old female with past medical history of proteinuria, hypertension, dyslipidemia, fatty liver, asthma, hypothyroidism who presents renal office for management of proteinuria.    1.  Proteinuria: History of subnephrotic  proteinuria as early as age 21.  Etiology never known.  Differential diagnosis includes IgA nephropathy versus hypertension induced.  At this time we will hold off renal biopsy and instead order serological workup including FEDERICO, anti-double-stranded DNA, C3, C4, SPEP, hepatitis panel, vasculitis panel.  Patient remains on high-dose lisinopril 40 mg daily along with amlodipine.  Most recent UPC ratio was 510 mg and lower than prior.  May consider an addition of spironolactone if proteinuria persist.    2.  Hypertension: Well-controlled while on current regimen of amlodipine/benazepril.  Blood pressure noted to be 126/84.    3.  Chronic kidney disease stage II: EGFR noted to be 66 however noted to have abnormal urinalysis with 2+ protein.    4.  Fatty liver: Remains on fish oil.  Recommended exercise and weight loss.  Recommended avoidance of acetaminophen.    .

## 2024-08-15 NOTE — PROGRESS NOTES
NEPHROLOGY OFFICE CONSULT  Bing Levy 54 y.o. female MRN: [id number]    Encounter: [id number] DATE: 8/15/2024    REASON FOR VISIT: Bing Levy is a 54 y.o.female who was referred by MARIANO Mejia for evaluation..    HPI:    This is a 54 years old female with longstanding history of proteinuria as early as age 21, development of hypertension several years ago, hypothyroidism, asthma, fatty liver who was referred to renal office by her PCP due to having history of proteinuria.  Patient was followed by an outside nephrologist for proteinuria with thoughts of patient having proteinuria secondary to hypertension versus IgA nephropathy.  A renal biopsy was offered last year.  Patient had been on high-dose amlodipine/benazepril and had noted reduction in proteinuria.  She denies having lower extremity edema.  Admits to significant improvement in blood pressure reading.  Patient denies using NSAIDs or high-dose acetaminophen in recent times.  She was found to have diagnosis of transaminitis as well as fatty liver after which she stopped taking acetaminophen.          PAST MEDICAL HISTORY:  Past Medical History:   Diagnosis Date    Hepatitis C     Hypertension        PAST SURGICAL HISTORY:  Past Surgical History:   Procedure Laterality Date    CHOLECYSTECTOMY         SOCIAL HISTORY:  Social History     Substance and Sexual Activity   Alcohol Use Yes    Comment: social drinker      Social History     Substance and Sexual Activity   Drug Use Never     Social History     Tobacco Use   Smoking Status Never   Smokeless Tobacco Never       FAMILY HISTORY:  Family History   Problem Relation Age of Onset    Stomach cancer Mother     Asthma Mother     Hypertension Father     Breast cancer Cousin        ALLERGY:  Allergies   Allergen Reactions    Shrimp Extract Allergy Skin Test - Food Allergy Anaphylaxis     itchy    Shrimp Flavor - Food Allergy Anaphylaxis     itchy    Bee Pollen Hives     Weather change     Pollen Extract Allergic  Rhinitis     Weather change        MEDICATIONS:    Current Outpatient Medications:     acetaminophen (TYLENOL) 500 mg tablet, Take 500-1,000 mg by mouth, Disp: , Rfl:     amLODIPine-benazepril (LOTREL) 10-40 MG per capsule, Take 1 capsule by mouth daily, Disp: 90 capsule, Rfl: 0    eszopiclone (LUNESTA) 2 mg tablet, Take 1 tablet (2 mg total) by mouth daily at bedtime as needed for sleep Take immediately before bedtime, Disp: 30 tablet, Rfl: 0    levothyroxine 25 mcg tablet, Take 1 tablet (25 mcg total) by mouth daily in the early morning, Disp: 100 tablet, Rfl: 3    montelukast (SINGULAIR) 10 mg tablet, TAKE 1 TABLET BY MOUTH DAILY AT BEDTIME (Patient taking differently: Take 10 mg by mouth as needed), Disp: 90 tablet, Rfl: 1    Omega-3 Fatty Acids (fish oil) 1,000 mg, Take 2,000 mg by mouth daily, Disp: , Rfl:     pantoprazole (PROTONIX) 40 mg tablet, Take 1 tablet (40 mg total) by mouth daily (Patient not taking: Reported on 8/15/2024), Disp: 90 tablet, Rfl: 1    REVIEW OF SYSTEMS:    Review of Systems   Constitutional: Negative.    HENT: Negative.     Eyes: Negative.    Respiratory: Negative.     Cardiovascular: Negative.    Gastrointestinal: Negative.    Endocrine: Negative.    Genitourinary: Negative.    Musculoskeletal: Negative.    Skin: Negative.    Allergic/Immunologic: Negative.    Neurological: Negative.    Hematological: Negative.    All other systems reviewed and are negative.      PHYSICAL EXAM:  Vitals:    08/15/24 1524   BP: 126/84   Pulse: 88   Resp: 16   Temp: 97.5 °F (36.4 °C)   TempSrc: Temporal   SpO2: 98%   Weight: 60.8 kg (134 lb)   Height: 5' (1.524 m)     Body mass index is 26.17 kg/m².    Physical Exam  Constitutional:       Appearance: She is well-developed.   HENT:      Head: Normocephalic and atraumatic.   Eyes:      Pupils: Pupils are equal, round, and reactive to light.   Cardiovascular:      Rate and Rhythm: Normal rate and regular rhythm.      Heart sounds: Normal heart sounds.    Pulmonary:      Effort: Pulmonary effort is normal.   Abdominal:      General: Bowel sounds are normal.      Palpations: Abdomen is soft.   Musculoskeletal:         General: Normal range of motion.      Cervical back: Neck supple.   Skin:     General: Skin is warm.   Neurological:      Mental Status: She is alert and oriented to person, place, and time.         LAB RESULTS:  Results for orders placed or performed in visit on 07/31/24   Naomi Melo Default   Result Value Ref Range    White Blood Cell Count 6.1 3.4 - 10.8 x10E3/uL    Red Blood Cell Count 4.66 3.77 - 5.28 x10E6/uL    Hemoglobin 13.9 11.1 - 15.9 g/dL    HCT 42.0 34.0 - 46.6 %    MCV 90 79 - 97 fL    MCH 29.8 26.6 - 33.0 pg    MCHC 33.1 31.5 - 35.7 g/dL    RDW 13.5 11.7 - 15.4 %    Platelet Count 246 150 - 450 x10E3/uL    Neutrophils 59 Not Estab. %    Lymphocytes 28 Not Estab. %    Monocytes 6 Not Estab. %    Eosinophils 6 Not Estab. %    Basophils PCT 1 Not Estab. %    Neutrophils (Absolute) 3.5 1.4 - 7.0 x10E3/uL    Lymphocytes (Absolute) 1.7 0.7 - 3.1 x10E3/uL    Monocytes (Absolute) 0.4 0.1 - 0.9 x10E3/uL    Eosinophils (Absolute) 0.4 0.0 - 0.4 x10E3/uL    Basophils ABS 0.1 0.0 - 0.2 x10E3/uL    Immature Granulocytes 0 Not Estab. %    Immature Granulocytes (Absolute) 0.0 0.0 - 0.1 x10E3/uL   Comprehensive metabolic panel   Result Value Ref Range    Glucose, Random 139 (H) 70 - 99 mg/dL    BUN 23 6 - 24 mg/dL    Creatinine 1.01 (H) 0.57 - 1.00 mg/dL    eGFR 67 >59 mL/min/1.73    SL AMB BUN/CREATININE RATIO 23 9 - 23    Sodium 140 134 - 144 mmol/L    Potassium 3.9 3.5 - 5.2 mmol/L    Chloride 102 96 - 106 mmol/L    CO2 23 20 - 29 mmol/L    CALCIUM 9.9 8.7 - 10.2 mg/dL    Protein, Total 7.7 6.0 - 8.5 g/dL    Albumin 4.6 3.8 - 4.9 g/dL    Globulin, Total 3.1 1.5 - 4.5 g/dL    TOTAL BILIRUBIN 0.6 0.0 - 1.2 mg/dL    Alk Phos Isoenzymes 92 44 - 121 IU/L    AST 26 0 - 40 IU/L    ALT 42 (H) 0 - 32 IU/L   Protein / creatinine ratio, urine   Result Value  Ref Range    Creatinine, Urine 211.2 Not Estab. mg/dL    Total Protein, Urine 109.6 Not Estab. mg/dL    Prot/Creat Ratio, Ur 519 (H) 0 - 200 mg/g creat         ASSESSMENT and PLAN:  Bing was seen today for consult and proteinuria.    Diagnoses and all orders for this visit:    Proteinuria, unspecified type  -     Ambulatory Referral to Nephrology  -     FEDERICO w/Reflex; Future  -     ANCA Screen With MPO and PR3 With Reflex To ANCA Titer; Future  -     Anti-DNA antibody, double-stranded; Future  -     C3 complement; Future  -     C4 complement; Future  -     Cryoglobulin; Future  -     Hepatitis B core antibody, total; Future  -     Hepatitis B surface antibody; Future  -     Hepatitis B surface antigen; Future  -     Hepatitis C antibody; Future  -     Immunoglobulin free LT chains blood; Future  -     Protein electrophoresis, serum; Future  -     Albumin; Standing  -     Albumin / creatinine urine ratio; Standing  -     Calcium; Standing  -     CBC and differential; Standing  -     Comprehensive metabolic panel; Standing  -     Phosphorus; Standing  -     Urinalysis with microscopic; Future  -     PTH, intact; Standing  -     Vitamin D 25 hydroxy; Standing      54 years old female with past medical history of proteinuria, hypertension, dyslipidemia, fatty liver, asthma, hypothyroidism who presents renal office for management of proteinuria.    1.  Proteinuria: History of subnephrotic proteinuria as early as age 21.  Etiology never known.  Differential diagnosis includes IgA nephropathy versus hypertension induced.  At this time we will hold off renal biopsy and instead order serological workup including FEDERICO, anti-double-stranded DNA, C3, C4, SPEP, hepatitis panel, vasculitis panel.  Patient remains on high-dose lisinopril 40 mg daily along with amlodipine.  Most recent UPC ratio was 510 mg and lower than prior.  May consider an addition of spironolactone if proteinuria persist.    2.  Hypertension: Well-controlled  while on current regimen of amlodipine/benazepril.  Blood pressure noted to be 126/84.    3.  Chronic kidney disease stage II: EGFR noted to be 66 however noted to have abnormal urinalysis with 2+ protein.    4.  Fatty liver: Remains on fish oil.  Recommended exercise and weight loss.  Recommended avoidance of acetaminophen.    .

## 2024-10-18 ENCOUNTER — HOSPITAL ENCOUNTER (OUTPATIENT)
Age: 54
Discharge: HOME | End: 2024-10-18
Payer: COMMERCIAL

## 2024-10-18 ENCOUNTER — APPOINTMENT (OUTPATIENT)
Age: 54
End: 2024-10-18
Payer: COMMERCIAL

## 2024-10-18 VITALS — WEIGHT: 134 LBS | HEIGHT: 60 IN | BODY MASS INDEX: 26.31 KG/M2

## 2024-10-18 DIAGNOSIS — E03.9 HYPOTHYROIDISM, UNSPECIFIED TYPE: ICD-10-CM

## 2024-10-18 DIAGNOSIS — R80.9 PROTEINURIA, UNSPECIFIED TYPE: ICD-10-CM

## 2024-10-18 DIAGNOSIS — Z12.31 ENCOUNTER FOR SCREENING MAMMOGRAM FOR MALIGNANT NEOPLASM OF BREAST: ICD-10-CM

## 2024-10-18 LAB
ALBUMIN SERPL BCG-MCNC: 4.8 G/DL (ref 3.5–5)
ALP SERPL-CCNC: 86 U/L (ref 34–104)
ALT SERPL W P-5'-P-CCNC: 85 U/L (ref 7–52)
ANION GAP SERPL CALCULATED.3IONS-SCNC: 12 MMOL/L (ref 4–13)
AST SERPL W P-5'-P-CCNC: 48 U/L (ref 13–39)
BASOPHILS # BLD AUTO: 0.13 THOUSANDS/ΜL (ref 0–0.1)
BASOPHILS NFR BLD AUTO: 2 % (ref 0–1)
BILIRUB SERPL-MCNC: 0.66 MG/DL (ref 0.2–1)
BUN SERPL-MCNC: 17 MG/DL (ref 5–25)
C3 SERPL-MCNC: 165 MG/DL (ref 87–200)
C4 SERPL-MCNC: 36 MG/DL (ref 19–52)
CALCIUM SERPL-MCNC: 9.4 MG/DL (ref 8.4–10.2)
CHLORIDE SERPL-SCNC: 102 MMOL/L (ref 96–108)
CO2 SERPL-SCNC: 25 MMOL/L (ref 21–32)
CREAT SERPL-MCNC: 0.91 MG/DL (ref 0.6–1.3)
EOSINOPHIL # BLD AUTO: 0.31 THOUSAND/ΜL (ref 0–0.61)
EOSINOPHIL NFR BLD AUTO: 4 % (ref 0–6)
ERYTHROCYTE [DISTWIDTH] IN BLOOD BY AUTOMATED COUNT: 12.5 % (ref 11.6–15.1)
GFR SERPL CREATININE-BSD FRML MDRD: 72 ML/MIN/1.73SQ M
GLUCOSE SERPL-MCNC: 88 MG/DL (ref 65–140)
HCT VFR BLD AUTO: 43.7 % (ref 34.8–46.1)
HGB BLD-MCNC: 14.4 G/DL (ref 11.5–15.4)
IMM GRANULOCYTES # BLD AUTO: 0.02 THOUSAND/UL (ref 0–0.2)
IMM GRANULOCYTES NFR BLD AUTO: 0 % (ref 0–2)
LYMPHOCYTES # BLD AUTO: 2.4 THOUSANDS/ΜL (ref 0.6–4.47)
LYMPHOCYTES NFR BLD AUTO: 32 % (ref 14–44)
MCH RBC QN AUTO: 30.1 PG (ref 26.8–34.3)
MCHC RBC AUTO-ENTMCNC: 33 G/DL (ref 31.4–37.4)
MCV RBC AUTO: 91 FL (ref 82–98)
MONOCYTES # BLD AUTO: 0.45 THOUSAND/ΜL (ref 0.17–1.22)
MONOCYTES NFR BLD AUTO: 6 % (ref 4–12)
NEUTROPHILS # BLD AUTO: 4.26 THOUSANDS/ΜL (ref 1.85–7.62)
NEUTS SEG NFR BLD AUTO: 56 % (ref 43–75)
NRBC BLD AUTO-RTO: 0 /100 WBCS
PHOSPHATE SERPL-MCNC: 3.3 MG/DL (ref 2.7–4.5)
PLATELET # BLD AUTO: 303 THOUSANDS/UL (ref 149–390)
PMV BLD AUTO: 9.9 FL (ref 8.9–12.7)
POTASSIUM SERPL-SCNC: 3.6 MMOL/L (ref 3.5–5.3)
PROT SERPL-MCNC: 8.4 G/DL (ref 6.4–8.4)
RBC # BLD AUTO: 4.79 MILLION/UL (ref 3.81–5.12)
SODIUM SERPL-SCNC: 139 MMOL/L (ref 135–147)
WBC # BLD AUTO: 7.57 THOUSAND/UL (ref 4.31–10.16)

## 2024-10-18 PROCEDURE — 82306 VITAMIN D 25 HYDROXY: CPT

## 2024-10-18 PROCEDURE — 82043 UR ALBUMIN QUANTITATIVE: CPT

## 2024-10-18 PROCEDURE — 82570 ASSAY OF URINE CREATININE: CPT

## 2024-10-18 PROCEDURE — 81001 URINALYSIS AUTO W/SCOPE: CPT

## 2024-10-18 PROCEDURE — 85025 COMPLETE CBC W/AUTO DIFF WBC: CPT

## 2024-10-18 PROCEDURE — 36415 COLL VENOUS BLD VENIPUNCTURE: CPT

## 2024-10-18 PROCEDURE — 86704 HEP B CORE ANTIBODY TOTAL: CPT

## 2024-10-18 PROCEDURE — 83970 ASSAY OF PARATHORMONE: CPT

## 2024-10-18 PROCEDURE — 83521 IG LIGHT CHAINS FREE EACH: CPT

## 2024-10-18 PROCEDURE — 77063 BREAST TOMOSYNTHESIS BI: CPT

## 2024-10-18 PROCEDURE — 80053 COMPREHEN METABOLIC PANEL: CPT

## 2024-10-18 PROCEDURE — 77067 SCR MAMMO BI INCL CAD: CPT

## 2024-10-18 PROCEDURE — 83520 IMMUNOASSAY QUANT NOS NONAB: CPT

## 2024-10-18 PROCEDURE — 86038 ANTINUCLEAR ANTIBODIES: CPT

## 2024-10-18 PROCEDURE — 86037 ANCA TITER EACH ANTIBODY: CPT

## 2024-10-18 PROCEDURE — 84443 ASSAY THYROID STIM HORMONE: CPT

## 2024-10-18 PROCEDURE — 84165 PROTEIN E-PHORESIS SERUM: CPT

## 2024-10-18 PROCEDURE — 86160 COMPLEMENT ANTIGEN: CPT

## 2024-10-18 PROCEDURE — 86225 DNA ANTIBODY NATIVE: CPT

## 2024-10-18 PROCEDURE — 86706 HEP B SURFACE ANTIBODY: CPT

## 2024-10-18 PROCEDURE — 87340 HEPATITIS B SURFACE AG IA: CPT

## 2024-10-18 PROCEDURE — 84156 ASSAY OF PROTEIN URINE: CPT

## 2024-10-18 PROCEDURE — 86803 HEPATITIS C AB TEST: CPT

## 2024-10-18 PROCEDURE — 84100 ASSAY OF PHOSPHORUS: CPT

## 2024-10-19 LAB
25(OH)D3 SERPL-MCNC: 20.7 NG/ML (ref 30–100)
ANA SER QL IA: NEGATIVE
BACTERIA UR QL AUTO: ABNORMAL /HPF
BILIRUB UR QL STRIP: NEGATIVE
CLARITY UR: CLEAR
COLOR UR: COLORLESS
CREAT UR-MCNC: 183.9 MG/DL
CREAT UR-MCNC: 183.9 MG/DL
GLUCOSE UR STRIP-MCNC: NEGATIVE MG/DL
HBV CORE AB SER QL: NORMAL
HBV SURFACE AB SER-ACNC: >500 MIU/ML
HBV SURFACE AG SER QL: NORMAL
HCV AB SER QL: NORMAL
HGB UR QL STRIP.AUTO: NEGATIVE
KETONES UR STRIP-MCNC: NEGATIVE MG/DL
LEUKOCYTE ESTERASE UR QL STRIP: NEGATIVE
MICROALBUMIN UR-MCNC: 1220.4 MG/L
MICROALBUMIN/CREAT 24H UR: 664 MG/G CREATININE (ref 0–30)
MUCOUS THREADS UR QL AUTO: ABNORMAL
NITRITE UR QL STRIP: NEGATIVE
NON-SQ EPI CELLS URNS QL MICRO: ABNORMAL /HPF
PH UR STRIP.AUTO: 5.5 [PH]
PROT UR STRIP-MCNC: ABNORMAL MG/DL
PROT UR-MCNC: 159.3 MG/DL
PROT/CREAT UR: 0.9 MG/G{CREAT} (ref 0–0.1)
PTH-INTACT SERPL-MCNC: 58 PG/ML (ref 12–88)
RBC #/AREA URNS AUTO: ABNORMAL /HPF
SP GR UR STRIP.AUTO: 1.01 (ref 1–1.03)
TSH SERPL DL<=0.05 MIU/L-ACNC: 4.15 UIU/ML (ref 0.45–4.5)
UROBILINOGEN UR STRIP-ACNC: <2 MG/DL
WBC #/AREA URNS AUTO: ABNORMAL /HPF

## 2024-10-21 LAB — DSDNA AB SER-ACNC: 1 IU/ML (ref 0–9)

## 2024-10-22 LAB
ALBUMIN SERPL ELPH-MCNC: 4.94 G/DL (ref 3.2–5.1)
ALBUMIN SERPL ELPH-MCNC: 59.5 % (ref 48–70)
ALPHA1 GLOB SERPL ELPH-MCNC: 0.23 G/DL (ref 0.15–0.47)
ALPHA1 GLOB SERPL ELPH-MCNC: 2.8 % (ref 1.8–7)
ALPHA2 GLOB SERPL ELPH-MCNC: 0.79 G/DL (ref 0.42–1.04)
ALPHA2 GLOB SERPL ELPH-MCNC: 9.5 % (ref 5.9–14.9)
BETA GLOB ABNORMAL SERPL ELPH-MCNC: 0.55 G/DL (ref 0.31–0.57)
BETA1 GLOB SERPL ELPH-MCNC: 6.6 % (ref 4.7–7.7)
BETA2 GLOB SERPL ELPH-MCNC: 6.4 % (ref 3.1–7.9)
BETA2+GAMMA GLOB SERPL ELPH-MCNC: 0.53 G/DL (ref 0.2–0.58)
C-ANCA TITR SER IF: NORMAL TITER
GAMMA GLOB ABNORMAL SERPL ELPH-MCNC: 1.26 G/DL (ref 0.4–1.66)
GAMMA GLOB SERPL ELPH-MCNC: 15.2 % (ref 6.9–22.3)
IGG/ALB SER: 1.47 {RATIO} (ref 1.1–1.8)
KAPPA LC FREE SER-MCNC: 21.7 MG/L (ref 3.3–19.4)
KAPPA LC FREE/LAMBDA FREE SER: 1.21 {RATIO} (ref 0.26–1.65)
LAMBDA LC FREE SERPL-MCNC: 17.9 MG/L (ref 5.7–26.3)
MYELOPEROXIDASE AB SER IA-ACNC: <0.2 UNITS (ref 0–0.9)
P-ANCA ATYPICAL TITR SER IF: NORMAL TITER
P-ANCA TITR SER IF: NORMAL TITER
PROT PATTERN SERPL ELPH-IMP: ABNORMAL
PROT SERPL-MCNC: 8.3 G/DL (ref 6.4–8.2)
PROTEINASE3 AB SER IA-ACNC: <0.2 UNITS (ref 0–0.9)

## 2024-10-22 PROCEDURE — 84165 PROTEIN E-PHORESIS SERUM: CPT | Performed by: PATHOLOGY

## 2024-10-27 DIAGNOSIS — I10 ESSENTIAL HYPERTENSION: ICD-10-CM

## 2024-10-27 RX ORDER — AMLODIPINE AND BENAZEPRIL HYDROCHLORIDE 10; 40 MG/1; MG/1
1 CAPSULE ORAL DAILY
Qty: 90 CAPSULE | Refills: 1 | Status: SHIPPED | OUTPATIENT
Start: 2024-10-27

## 2024-11-13 DIAGNOSIS — Z91.09 ENVIRONMENTAL ALLERGIES: ICD-10-CM

## 2024-11-13 RX ORDER — MONTELUKAST SODIUM 10 MG/1
10 TABLET ORAL
Qty: 90 TABLET | Refills: 1 | Status: SHIPPED | OUTPATIENT
Start: 2024-11-13

## 2024-11-18 ENCOUNTER — OFFICE VISIT (OUTPATIENT)
Dept: FAMILY MEDICINE CLINIC | Facility: CLINIC | Age: 54
End: 2024-11-18
Payer: COMMERCIAL

## 2024-11-18 ENCOUNTER — DOCUMENTATION (OUTPATIENT)
Dept: HEMATOLOGY ONCOLOGY | Facility: CLINIC | Age: 54
End: 2024-11-18

## 2024-11-18 ENCOUNTER — APPOINTMENT (OUTPATIENT)
Dept: LAB | Facility: HOSPITAL | Age: 54
End: 2024-11-18
Payer: COMMERCIAL

## 2024-11-18 VITALS
BODY MASS INDEX: 26.82 KG/M2 | HEIGHT: 60 IN | HEART RATE: 78 BPM | DIASTOLIC BLOOD PRESSURE: 76 MMHG | WEIGHT: 136.6 LBS | OXYGEN SATURATION: 98 % | RESPIRATION RATE: 18 BRPM | SYSTOLIC BLOOD PRESSURE: 122 MMHG

## 2024-11-18 DIAGNOSIS — I10 ESSENTIAL HYPERTENSION: ICD-10-CM

## 2024-11-18 DIAGNOSIS — R79.89 ELEVATED LIVER FUNCTION TESTS: ICD-10-CM

## 2024-11-18 DIAGNOSIS — R73.01 IFG (IMPAIRED FASTING GLUCOSE): ICD-10-CM

## 2024-11-18 DIAGNOSIS — Z00.00 ANNUAL PHYSICAL EXAM: Primary | ICD-10-CM

## 2024-11-18 DIAGNOSIS — E78.2 MIXED HYPERLIPIDEMIA: ICD-10-CM

## 2024-11-18 DIAGNOSIS — Z00.00 HEALTHCARE MAINTENANCE: ICD-10-CM

## 2024-11-18 DIAGNOSIS — R50.9 FEVER, UNSPECIFIED FEVER CAUSE: ICD-10-CM

## 2024-11-18 DIAGNOSIS — Z23 ENCOUNTER FOR IMMUNIZATION: ICD-10-CM

## 2024-11-18 DIAGNOSIS — R80.9 PROTEINURIA, UNSPECIFIED TYPE: ICD-10-CM

## 2024-11-18 LAB
ALBUMIN SERPL BCG-MCNC: 4.6 G/DL (ref 3.5–5)
ALP SERPL-CCNC: 80 U/L (ref 34–104)
ALT SERPL W P-5'-P-CCNC: 82 U/L (ref 7–52)
ANION GAP SERPL CALCULATED.3IONS-SCNC: 9 MMOL/L (ref 4–13)
AST SERPL W P-5'-P-CCNC: 42 U/L (ref 13–39)
BASOPHILS # BLD AUTO: 0.1 THOUSANDS/ÂΜL (ref 0–0.1)
BASOPHILS NFR BLD AUTO: 2 % (ref 0–1)
BILIRUB SERPL-MCNC: 0.74 MG/DL (ref 0.2–1)
BUN SERPL-MCNC: 17 MG/DL (ref 5–25)
CALCIUM SERPL-MCNC: 9.4 MG/DL (ref 8.4–10.2)
CHLORIDE SERPL-SCNC: 105 MMOL/L (ref 96–108)
CO2 SERPL-SCNC: 26 MMOL/L (ref 21–32)
CREAT SERPL-MCNC: 0.85 MG/DL (ref 0.6–1.3)
EOSINOPHIL # BLD AUTO: 0.32 THOUSAND/ÂΜL (ref 0–0.61)
EOSINOPHIL NFR BLD AUTO: 5 % (ref 0–6)
ERYTHROCYTE [DISTWIDTH] IN BLOOD BY AUTOMATED COUNT: 12.9 % (ref 11.6–15.1)
GFR SERPL CREATININE-BSD FRML MDRD: 78 ML/MIN/1.73SQ M
GLUCOSE P FAST SERPL-MCNC: 115 MG/DL (ref 65–99)
HCT VFR BLD AUTO: 42 % (ref 34.8–46.1)
HGB BLD-MCNC: 13.7 G/DL (ref 11.5–15.4)
IMM GRANULOCYTES # BLD AUTO: 0.02 THOUSAND/UL (ref 0–0.2)
IMM GRANULOCYTES NFR BLD AUTO: 0 % (ref 0–2)
LYMPHOCYTES # BLD AUTO: 1.91 THOUSANDS/ÂΜL (ref 0.6–4.47)
LYMPHOCYTES NFR BLD AUTO: 29 % (ref 14–44)
MCH RBC QN AUTO: 30 PG (ref 26.8–34.3)
MCHC RBC AUTO-ENTMCNC: 32.6 G/DL (ref 31.4–37.4)
MCV RBC AUTO: 92 FL (ref 82–98)
MONOCYTES # BLD AUTO: 0.39 THOUSAND/ÂΜL (ref 0.17–1.22)
MONOCYTES NFR BLD AUTO: 6 % (ref 4–12)
NEUTROPHILS # BLD AUTO: 3.77 THOUSANDS/ÂΜL (ref 1.85–7.62)
NEUTS SEG NFR BLD AUTO: 58 % (ref 43–75)
NRBC BLD AUTO-RTO: 0 /100 WBCS
PLATELET # BLD AUTO: 271 THOUSANDS/UL (ref 149–390)
PMV BLD AUTO: 9 FL (ref 8.9–12.7)
POTASSIUM SERPL-SCNC: 3.8 MMOL/L (ref 3.5–5.3)
PROT SERPL-MCNC: 7.7 G/DL (ref 6.4–8.4)
RBC # BLD AUTO: 4.57 MILLION/UL (ref 3.81–5.12)
SODIUM SERPL-SCNC: 140 MMOL/L (ref 135–147)
WBC # BLD AUTO: 6.51 THOUSAND/UL (ref 4.31–10.16)

## 2024-11-18 PROCEDURE — 82595 ASSAY OF CRYOGLOBULIN: CPT

## 2024-11-18 PROCEDURE — 80053 COMPREHEN METABOLIC PANEL: CPT

## 2024-11-18 PROCEDURE — 85025 COMPLETE CBC W/AUTO DIFF WBC: CPT

## 2024-11-18 PROCEDURE — 99396 PREV VISIT EST AGE 40-64: CPT | Performed by: NURSE PRACTITIONER

## 2024-11-18 PROCEDURE — 90471 IMMUNIZATION ADMIN: CPT | Performed by: NURSE PRACTITIONER

## 2024-11-18 PROCEDURE — 90673 RIV3 VACCINE NO PRESERV IM: CPT | Performed by: NURSE PRACTITIONER

## 2024-11-18 NOTE — PROGRESS NOTES
Adult Annual Physical  Name: Bing Levy      : 1970      MRN: [id number]  Encounter Provider: MARIANO Mejia  Encounter Date: 2024   Encounter department: [facility] [address]    Assessment & Plan  Encounter for immunization    Orders:    influenza vaccine, recombinant, PF, 0.5 mL IM (Flublok)    Healthcare maintenance    Orders:    TSH, 3rd generation with Free T4 reflex; Future    CBC and differential; Future    Comprehensive metabolic panel; Future    Hemoglobin A1C; Future    Lipid panel; Future    IFG (impaired fasting glucose)    Orders:    Hemoglobin A1C; Future    Fever, unspecified fever cause    Orders:    Ambulatory Referral to Hematology / Oncology; Future    Essential hypertension  BP well managed with lotrel.        Elevated liver function tests         Mixed hyperlipidemia  Obtain lipid panel prior to next visit.        Annual physical exam         Immunizations and preventive care screenings were discussed with patient today. Appropriate education was printed on patient's after visit summary.    Counseling:  Exercise: the importance of regular exercise/physical activity was discussed. Recommend exercise 3-5 times per week for at least 30 minutes.       Depression Screening and Follow-up Plan: Patient was screened for depression during today's encounter. They screened negative with a PHQ-2 score of 0.        History of Present Illness     Adult Annual Physical:  Patient presents for annual physical. Patient presents for annual physical. Continues with unexplained fevers--tmax 101, seems to be random with no other symptoms..     Diet and Physical Activity:  - Diet/Nutrition: well balanced diet.  - Exercise: walking.    Depression Screening:  - PHQ-2 Score: 0    General Health:  - Sleep: sleeps poorly. using lunesta  - Hearing: normal hearing bilateral ears.  - Vision: wears glasses and goes for regular eye exams.  - Dental: regular dental  visits.    /GYN Health:  - Follows with GYN: yes.   - Menopause: postmenopausal.   - History of STDs: no    Review of Systems   Constitutional:  Positive for fatigue and fever (tmax 101). Negative for chills and diaphoresis.   HENT:  Negative for ear pain and sore throat.    Eyes:  Negative for pain and visual disturbance.   Respiratory:  Positive for shortness of breath. Negative for cough, chest tightness and wheezing.    Cardiovascular:  Negative for chest pain and palpitations.   Gastrointestinal:  Positive for diarrhea. Negative for abdominal pain, nausea and vomiting.   Genitourinary:  Negative for dysuria and hematuria.   Musculoskeletal:  Negative for arthralgias and back pain.   Skin:  Negative for color change and rash.   Neurological:  Positive for headaches. Negative for dizziness, seizures, syncope and light-headedness.   Psychiatric/Behavioral:  Positive for sleep disturbance. Negative for dysphoric mood. The patient is not nervous/anxious.    All other systems reviewed and are negative.    Current Outpatient Medications on File Prior to Visit   Medication Sig Dispense Refill    acetaminophen (TYLENOL) 500 mg tablet Take 500-1,000 mg by mouth      amLODIPine-benazepril (LOTREL) 10-40 MG per capsule TAKE 1 CAPSULE BY MOUTH EVERY DAY 90 capsule 1    eszopiclone (LUNESTA) 2 mg tablet Take 1 tablet (2 mg total) by mouth daily at bedtime as needed for sleep Take immediately before bedtime 30 tablet 0    levothyroxine 25 mcg tablet Take 1 tablet (25 mcg total) by mouth daily in the early morning 100 tablet 3    montelukast (SINGULAIR) 10 mg tablet TAKE 1 TABLET BY MOUTH DAILY AT BEDTIME 90 tablet 1    Omega-3 Fatty Acids (fish oil) 1,000 mg Take 2,000 mg by mouth daily      [DISCONTINUED] pantoprazole (PROTONIX) 40 mg tablet Take 1 tablet (40 mg total) by mouth daily (Patient not taking: Reported on 8/15/2024) 90 tablet 1     No current facility-administered medications on file prior to visit.         Objective   /76 (BP Location: Left arm, Patient Position: Sitting)   Pulse 78   Resp 18   Ht 5' (1.524 m)   Wt 62 kg (136 lb 9.6 oz)   LMP 10/12/2022   SpO2 98%   BMI 26.68 kg/m²     Physical Exam  Vitals and nursing note reviewed.   Constitutional:       General: She is not in acute distress.     Appearance: She is well-developed.   HENT:      Head: Normocephalic and atraumatic.      Right Ear: Tympanic membrane normal.      Left Ear: Tympanic membrane normal.      Nose: No congestion.      Mouth/Throat:      Pharynx: No oropharyngeal exudate.   Eyes:      Conjunctiva/sclera: Conjunctivae normal.   Cardiovascular:      Rate and Rhythm: Normal rate and regular rhythm.      Heart sounds: No murmur heard.  Pulmonary:      Effort: Pulmonary effort is normal. No respiratory distress.      Breath sounds: Normal breath sounds.   Abdominal:      Palpations: Abdomen is soft.      Tenderness: There is no abdominal tenderness.   Musculoskeletal:         General: No swelling.      Cervical back: Neck supple.   Skin:     General: Skin is warm and dry.      Capillary Refill: Capillary refill takes less than 2 seconds.   Neurological:      Mental Status: She is alert and oriented to person, place, and time.   Psychiatric:         Mood and Affect: Mood normal.       Administrative Statements   I have spent a total time of 20 minutes in caring for this patient on the day of the visit/encounter including Counseling / Coordination of care, Documenting in the medical record, and Reviewing / ordering tests, medicine, procedures  . Topics discussed with the patient / family include symptom assessment and management and medication review.

## 2024-11-22 LAB — CRYOGLOB SER QL 1D COLD INC: NORMAL

## 2024-12-30 ENCOUNTER — OFFICE VISIT (OUTPATIENT)
Dept: NEPHROLOGY | Facility: CLINIC | Age: 54
End: 2024-12-30
Payer: COMMERCIAL

## 2024-12-30 VITALS
BODY MASS INDEX: 26.31 KG/M2 | TEMPERATURE: 97.3 F | WEIGHT: 134 LBS | HEIGHT: 60 IN | SYSTOLIC BLOOD PRESSURE: 126 MMHG | RESPIRATION RATE: 16 BRPM | HEART RATE: 86 BPM | DIASTOLIC BLOOD PRESSURE: 80 MMHG | OXYGEN SATURATION: 98 %

## 2024-12-30 DIAGNOSIS — R80.1 PERSISTENT PROTEINURIA: Primary | ICD-10-CM

## 2024-12-30 PROCEDURE — 99214 OFFICE O/P EST MOD 30 MIN: CPT | Performed by: INTERNAL MEDICINE

## 2024-12-30 RX ORDER — SPIRONOLACTONE 25 MG/1
25 TABLET ORAL DAILY
Qty: 90 TABLET | Refills: 3 | Status: SHIPPED | OUTPATIENT
Start: 2024-12-30

## 2024-12-30 NOTE — PROGRESS NOTES
Name: Bing Levy      : 1970      MRN: [id number]  Encounter Provider: Lary Ayoub MD  Encounter Date: 2024   Encounter department: [facility]  :  Assessment & Plan  Persistent proteinuria    Orders:    spironolactone (ALDACTONE) 25 mg tablet; Take 1 tablet (25 mg total) by mouth daily    Albumin; Standing    Albumin / creatinine urine ratio; Standing    Calcium; Standing    Comprehensive metabolic panel; Standing    Phosphorus; Standing    Urinalysis with microscopic; Future    Vitamin D 25 hydroxy; Standing    Prior h/o of proteinuria attributed to hypertension. Serological studies are negative.   Most recent quantification revealed 644 mg and persistently elevated.   Will add Aldactone 25 mg daily to her regimen in addition to Lotrel in order to reduce proteinuria  Fortunately, her serum creatinine remains normal.     History of Present Illness   HPI  Bing Levy is a 54 y.o. female who presents to renal office for management of proteinuria. Offers no complaints.   History obtained from: patient    Review of Systems   Constitutional: Negative.    HENT: Negative.     Eyes: Negative.    Respiratory: Negative.     Cardiovascular: Negative.    Gastrointestinal: Negative.    Endocrine: Negative.    Genitourinary: Negative.    Musculoskeletal: Negative.    Skin: Negative.    Allergic/Immunologic: Negative.    Neurological: Negative.    Hematological: Negative.    All other systems reviewed and are negative.    Medical History Reviewed by provider this encounter:     .     Objective   /80 (Patient Position: Sitting, Cuff Size: Standard)   Pulse 86   Temp (!) 97.3 °F (36.3 °C) (Temporal)   Resp 16   Ht 5' (1.524 m)   Wt 60.8 kg (134 lb)   LMP 10/12/2022   SpO2 98%   BMI 26.17 kg/m²      Physical Exam  Constitutional:       Appearance: She is well-developed.   HENT:      Head: Normocephalic and atraumatic.   Eyes:      Pupils: Pupils are equal, round, and  reactive to light.   Cardiovascular:      Rate and Rhythm: Normal rate and regular rhythm.      Heart sounds: Normal heart sounds.   Pulmonary:      Effort: Pulmonary effort is normal.   Abdominal:      General: Bowel sounds are normal.      Palpations: Abdomen is soft.   Musculoskeletal:         General: Normal range of motion.      Cervical back: Neck supple.   Skin:     General: Skin is warm.   Neurological:      Mental Status: She is alert and oriented to person, place, and time.         Administrative Statements   I have spent a total time of 33 minutes in caring for this patient on the day of the visit/encounter including Diagnostic results, Prognosis, Risks and benefits of tx options, Instructions for management, Importance of tx compliance, and Risk factor reductions.

## 2025-05-05 DIAGNOSIS — I10 ESSENTIAL HYPERTENSION: ICD-10-CM

## 2025-05-05 RX ORDER — AMLODIPINE AND BENAZEPRIL HYDROCHLORIDE 10; 40 MG/1; MG/1
1 CAPSULE ORAL DAILY
Qty: 90 CAPSULE | Refills: 1 | Status: SHIPPED | OUTPATIENT
Start: 2025-05-05

## 2025-05-06 ENCOUNTER — TELEPHONE (OUTPATIENT)
Age: 55
End: 2025-05-06

## 2025-05-13 ENCOUNTER — RA CDI HCC (OUTPATIENT)
Dept: OTHER | Facility: HOSPITAL | Age: 55
End: 2025-05-13

## 2025-05-16 ENCOUNTER — APPOINTMENT (OUTPATIENT)
Dept: LAB | Facility: HOSPITAL | Age: 55
End: 2025-05-16
Payer: COMMERCIAL

## 2025-05-16 DIAGNOSIS — R73.01 IFG (IMPAIRED FASTING GLUCOSE): ICD-10-CM

## 2025-05-16 DIAGNOSIS — Z00.00 HEALTHCARE MAINTENANCE: ICD-10-CM

## 2025-05-16 LAB
ALBUMIN SERPL BCG-MCNC: 4.4 G/DL (ref 3.5–5)
ALP SERPL-CCNC: 73 U/L (ref 34–104)
ALT SERPL W P-5'-P-CCNC: 85 U/L (ref 7–52)
ANION GAP SERPL CALCULATED.3IONS-SCNC: 8 MMOL/L (ref 4–13)
AST SERPL W P-5'-P-CCNC: 41 U/L (ref 13–39)
BASOPHILS # BLD AUTO: 0.1 THOUSANDS/ÂΜL (ref 0–0.1)
BASOPHILS NFR BLD AUTO: 2 % (ref 0–1)
BILIRUB SERPL-MCNC: 0.62 MG/DL (ref 0.2–1)
BUN SERPL-MCNC: 23 MG/DL (ref 5–25)
CALCIUM SERPL-MCNC: 9.4 MG/DL (ref 8.4–10.2)
CHLORIDE SERPL-SCNC: 109 MMOL/L (ref 96–108)
CHOLEST SERPL-MCNC: 214 MG/DL (ref ?–200)
CO2 SERPL-SCNC: 23 MMOL/L (ref 21–32)
CREAT SERPL-MCNC: 0.8 MG/DL (ref 0.6–1.3)
EOSINOPHIL # BLD AUTO: 0.38 THOUSAND/ÂΜL (ref 0–0.61)
EOSINOPHIL NFR BLD AUTO: 6 % (ref 0–6)
ERYTHROCYTE [DISTWIDTH] IN BLOOD BY AUTOMATED COUNT: 12.6 % (ref 11.6–15.1)
EST. AVERAGE GLUCOSE BLD GHB EST-MCNC: 137 MG/DL
GFR SERPL CREATININE-BSD FRML MDRD: 83 ML/MIN/1.73SQ M
GLUCOSE P FAST SERPL-MCNC: 122 MG/DL (ref 65–99)
HBA1C MFR BLD: 6.4 %
HCT VFR BLD AUTO: 42 % (ref 34.8–46.1)
HDLC SERPL-MCNC: 52 MG/DL
HGB BLD-MCNC: 14 G/DL (ref 11.5–15.4)
IMM GRANULOCYTES # BLD AUTO: 0.02 THOUSAND/UL (ref 0–0.2)
IMM GRANULOCYTES NFR BLD AUTO: 0 % (ref 0–2)
LDLC SERPL CALC-MCNC: 132 MG/DL (ref 0–100)
LYMPHOCYTES # BLD AUTO: 2.12 THOUSANDS/ÂΜL (ref 0.6–4.47)
LYMPHOCYTES NFR BLD AUTO: 32 % (ref 14–44)
MCH RBC QN AUTO: 30.4 PG (ref 26.8–34.3)
MCHC RBC AUTO-ENTMCNC: 33.3 G/DL (ref 31.4–37.4)
MCV RBC AUTO: 91 FL (ref 82–98)
MONOCYTES # BLD AUTO: 0.4 THOUSAND/ÂΜL (ref 0.17–1.22)
MONOCYTES NFR BLD AUTO: 6 % (ref 4–12)
NEUTROPHILS # BLD AUTO: 3.7 THOUSANDS/ÂΜL (ref 1.85–7.62)
NEUTS SEG NFR BLD AUTO: 54 % (ref 43–75)
NONHDLC SERPL-MCNC: 162 MG/DL
NRBC BLD AUTO-RTO: 0 /100 WBCS
PLATELET # BLD AUTO: 256 THOUSANDS/UL (ref 149–390)
PMV BLD AUTO: 9 FL (ref 8.9–12.7)
POTASSIUM SERPL-SCNC: 4.3 MMOL/L (ref 3.5–5.3)
PROT SERPL-MCNC: 7.8 G/DL (ref 6.4–8.4)
RBC # BLD AUTO: 4.61 MILLION/UL (ref 3.81–5.12)
SODIUM SERPL-SCNC: 140 MMOL/L (ref 135–147)
T4 FREE SERPL-MCNC: 0.77 NG/DL (ref 0.61–1.12)
TRIGL SERPL-MCNC: 148 MG/DL (ref ?–150)
TSH SERPL DL<=0.05 MIU/L-ACNC: 5.16 UIU/ML (ref 0.45–4.5)
WBC # BLD AUTO: 6.72 THOUSAND/UL (ref 4.31–10.16)

## 2025-05-16 PROCEDURE — 80053 COMPREHEN METABOLIC PANEL: CPT

## 2025-05-16 PROCEDURE — 80061 LIPID PANEL: CPT

## 2025-05-16 PROCEDURE — 83036 HEMOGLOBIN GLYCOSYLATED A1C: CPT

## 2025-05-16 PROCEDURE — 84443 ASSAY THYROID STIM HORMONE: CPT

## 2025-05-16 PROCEDURE — 85025 COMPLETE CBC W/AUTO DIFF WBC: CPT

## 2025-05-16 PROCEDURE — 36415 COLL VENOUS BLD VENIPUNCTURE: CPT

## 2025-05-16 PROCEDURE — 84439 ASSAY OF FREE THYROXINE: CPT

## 2025-05-19 ENCOUNTER — OFFICE VISIT (OUTPATIENT)
Dept: FAMILY MEDICINE CLINIC | Facility: CLINIC | Age: 55
End: 2025-05-19
Payer: COMMERCIAL

## 2025-05-19 VITALS
TEMPERATURE: 98.1 F | DIASTOLIC BLOOD PRESSURE: 72 MMHG | HEIGHT: 60 IN | BODY MASS INDEX: 27.8 KG/M2 | SYSTOLIC BLOOD PRESSURE: 118 MMHG | HEART RATE: 76 BPM | WEIGHT: 141.6 LBS | OXYGEN SATURATION: 96 %

## 2025-05-19 DIAGNOSIS — Z00.00 HEALTHCARE MAINTENANCE: ICD-10-CM

## 2025-05-19 DIAGNOSIS — R73.01 IFG (IMPAIRED FASTING GLUCOSE): ICD-10-CM

## 2025-05-19 DIAGNOSIS — E01.0 THYROMEGALY: ICD-10-CM

## 2025-05-19 DIAGNOSIS — R79.89 ELEVATED LIVER FUNCTION TESTS: ICD-10-CM

## 2025-05-19 DIAGNOSIS — I10 ESSENTIAL HYPERTENSION: ICD-10-CM

## 2025-05-19 DIAGNOSIS — E03.9 HYPOTHYROIDISM, UNSPECIFIED TYPE: Primary | ICD-10-CM

## 2025-05-19 DIAGNOSIS — E78.2 MIXED HYPERLIPIDEMIA: ICD-10-CM

## 2025-05-19 PROCEDURE — 99214 OFFICE O/P EST MOD 30 MIN: CPT | Performed by: NURSE PRACTITIONER

## 2025-05-19 RX ORDER — LEVOTHYROXINE SODIUM 25 UG/1
25 TABLET ORAL
Qty: 100 TABLET | Refills: 3 | Status: SHIPPED | OUTPATIENT
Start: 2025-05-19

## 2025-05-19 NOTE — ASSESSMENT & PLAN NOTE
Continue with healthy diet and fish oil.  I would caution from statin use due to elevated liver function.  Orders:    Lipid panel; Future

## 2025-05-19 NOTE — ASSESSMENT & PLAN NOTE
Blood pressure is well-managed with Lotrel daily.  Recently started on spironolactone for proteinuria.

## 2025-05-19 NOTE — ASSESSMENT & PLAN NOTE
Continues to be mildly elevated.  Patient did have a workup and found to have fatty liver disease.

## 2025-05-19 NOTE — PROGRESS NOTES
Name: Bing Levy      : 1970      MRN: [id number]  Encounter Provider: MARIANO Mejia  Encounter Date: 2025   Encounter department: [facility] [address]  :  Assessment & Plan  Hypothyroidism, unspecified type  Patient has not been taking levothyroxine for several months as she received a letter from the pharmacy that it was recalled.  Will start back on levothyroxine daily, obtain ultrasound thyroid and repeat thyroid function in 8 weeks.  Orders:    levothyroxine 25 mcg tablet; Take 1 tablet (25 mcg total) by mouth daily in the early morning    US thyroid; Future    TSH, 3rd generation with Free T4 reflex; Future    Thyromegaly  Will obtain ultrasound thyroid to assess.  Orders:    US thyroid; Future    IFG (impaired fasting glucose)  Discussed that A1c has greatly increased.  We discussed low carbohydrate diet, weight loss and daily exercise.  She will return in 6 months with blood work prior.  Orders:    Hemoglobin A1C; Future    Mixed hyperlipidemia  Continue with healthy diet and fish oil.  I would caution from statin use due to elevated liver function.  Orders:    Lipid panel; Future    Healthcare maintenance    Orders:    CBC and differential; Future    Comprehensive metabolic panel; Future    Hemoglobin A1C; Future    Lipid panel; Future    TSH, 3rd generation with Free T4 reflex; Future    Essential hypertension  Blood pressure is well-managed with Lotrel daily.  Recently started on spironolactone for proteinuria.       Elevated liver function tests  Continues to be mildly elevated.  Patient did have a workup and found to have fatty liver disease.              History of Present Illness   Patient presents for routine follow up. She is concerned with weight gain. She has not been taking levothyroxine as she got a letter from the pharmacy that it was recalled.       Review of Systems   Constitutional:  Negative for chills, diaphoresis and fever.   HENT:   Negative for ear pain and sore throat.    Eyes:  Negative for pain and visual disturbance.   Respiratory:  Negative for cough and shortness of breath.    Cardiovascular:  Negative for chest pain and palpitations.   Gastrointestinal:  Positive for diarrhea. Negative for abdominal pain, constipation, nausea and vomiting.   Genitourinary:  Negative for dysuria, frequency and hematuria.   Musculoskeletal:  Negative for arthralgias and back pain.   Skin:  Negative for color change and rash.   Neurological:  Negative for dizziness, seizures, syncope, light-headedness and headaches.   Psychiatric/Behavioral:  Positive for sleep disturbance. Negative for dysphoric mood. The patient is not nervous/anxious.    All other systems reviewed and are negative.      Objective   /72 (BP Location: Left arm, Patient Position: Sitting)   Pulse 76   Temp 98.1 °F (36.7 °C)   Ht 5' (1.524 m)   Wt 64.2 kg (141 lb 9.6 oz)   LMP 10/12/2022   SpO2 96%   BMI 27.65 kg/m²      Physical Exam  Vitals and nursing note reviewed.   Constitutional:       General: She is not in acute distress.     Appearance: She is well-developed.   HENT:      Head: Normocephalic and atraumatic.   Neck:      Thyroid: Thyromegaly present.     Cardiovascular:      Rate and Rhythm: Normal rate and regular rhythm.      Heart sounds: No murmur heard.  Pulmonary:      Effort: Pulmonary effort is normal. No respiratory distress.      Breath sounds: Normal breath sounds.     Musculoskeletal:         General: No swelling.      Cervical back: Neck supple.     Skin:     General: Skin is warm and dry.      Capillary Refill: Capillary refill takes less than 2 seconds.     Neurological:      Mental Status: She is alert and oriented to person, place, and time.     Psychiatric:         Mood and Affect: Mood normal.

## 2025-06-30 ENCOUNTER — TELEPHONE (OUTPATIENT)
Dept: NEPHROLOGY | Facility: CLINIC | Age: 55
End: 2025-06-30

## 2025-07-07 ENCOUNTER — APPOINTMENT (OUTPATIENT)
Dept: LAB | Facility: HOSPITAL | Age: 55
End: 2025-07-07
Payer: COMMERCIAL

## 2025-07-07 DIAGNOSIS — R80.1 PERSISTENT PROTEINURIA: ICD-10-CM

## 2025-07-07 LAB
25(OH)D3 SERPL-MCNC: 21.8 NG/ML (ref 30–100)
ALBUMIN SERPL BCG-MCNC: 5.2 G/DL (ref 3.5–5)
ALP SERPL-CCNC: 88 U/L (ref 34–104)
ALT SERPL W P-5'-P-CCNC: 181 U/L (ref 7–52)
ANION GAP SERPL CALCULATED.3IONS-SCNC: 9 MMOL/L (ref 4–13)
AST SERPL W P-5'-P-CCNC: 71 U/L (ref 13–39)
BACTERIA UR QL AUTO: ABNORMAL /HPF
BILIRUB SERPL-MCNC: 0.77 MG/DL (ref 0.2–1)
BILIRUB UR QL STRIP: NEGATIVE
BUN SERPL-MCNC: 15 MG/DL (ref 5–25)
CALCIUM SERPL-MCNC: 10.5 MG/DL (ref 8.4–10.2)
CHLORIDE SERPL-SCNC: 101 MMOL/L (ref 96–108)
CLARITY UR: CLEAR
CO2 SERPL-SCNC: 27 MMOL/L (ref 21–32)
COLOR UR: YELLOW
CREAT SERPL-MCNC: 0.95 MG/DL (ref 0.6–1.3)
GFR SERPL CREATININE-BSD FRML MDRD: 68 ML/MIN/1.73SQ M
GLUCOSE P FAST SERPL-MCNC: 111 MG/DL (ref 65–99)
GLUCOSE UR STRIP-MCNC: NEGATIVE MG/DL
HGB UR QL STRIP.AUTO: ABNORMAL
KETONES UR STRIP-MCNC: NEGATIVE MG/DL
LEUKOCYTE ESTERASE UR QL STRIP: NEGATIVE
MUCOUS THREADS UR QL AUTO: ABNORMAL
NITRITE UR QL STRIP: NEGATIVE
NON-SQ EPI CELLS URNS QL MICRO: ABNORMAL /HPF
PH UR STRIP.AUTO: 5.5 [PH]
PHOSPHATE SERPL-MCNC: 3.6 MG/DL (ref 2.7–4.5)
POTASSIUM SERPL-SCNC: 4.2 MMOL/L (ref 3.5–5.3)
PROT SERPL-MCNC: 9 G/DL (ref 6.4–8.4)
PROT UR STRIP-MCNC: ABNORMAL MG/DL
RBC #/AREA URNS AUTO: ABNORMAL /HPF
SODIUM SERPL-SCNC: 137 MMOL/L (ref 135–147)
SP GR UR STRIP.AUTO: 1.02 (ref 1–1.03)
UROBILINOGEN UR STRIP-ACNC: <2 MG/DL
WBC #/AREA URNS AUTO: ABNORMAL /HPF

## 2025-07-07 PROCEDURE — 81001 URINALYSIS AUTO W/SCOPE: CPT

## 2025-07-07 PROCEDURE — 84100 ASSAY OF PHOSPHORUS: CPT

## 2025-07-07 PROCEDURE — 80053 COMPREHEN METABOLIC PANEL: CPT

## 2025-07-07 PROCEDURE — 36415 COLL VENOUS BLD VENIPUNCTURE: CPT

## 2025-07-07 PROCEDURE — 82306 VITAMIN D 25 HYDROXY: CPT

## 2025-07-09 NOTE — PROGRESS NOTES
Diagnoses and all orders for this visit:    Encounter for gynecological examination without abnormal finding    Encounter for screening mammogram for malignant neoplasm of breast  -     Mammo screening bilateral w 3d and cad; Future    Advised to call with any Post Menopausal bleeding.   Calcium/ Vit D dietary requirements discussed,   Weight bearing exercises minium of 150 mins/weekly advised.   Kegel exercises advised daily, see after visit summary for instructions and recommendations  Reviewed perineal hygiene and vaginal dryness post menopause  SBE and yearly mammography advised.  ASCCP guidelines reviewed. Will discontinue PAP's according to recommendations. Condoms encouraged with all sexual activity to prevent STI's.   Health maintenance encouraged with PMD, remain current with Colonoscopy, Dexa scan, and recommended vaccines.  Advised to call with any issues, all concerns & questions addressed.   See after visit summary for further information and recommendations to the above mentioned subjects which we may or may not have covered in detail during your visit     F/U annually or biennial if Medicare       Health Maintenance:    Last PAP: 2023 Neg HPV Neg ( [facility])  Next PAP Due:2026    Last Mammogram:10/18/2024  Life time Zhanna Caldera % 5.64, Density B scattered areas of fibroglandular density , Bi-Rads 2 Benign  Next Mammogram: Order given    Last Colonoscopy:2021 repeat in 5 years     Last DEXA: Not on file    Subjective    CC: Yearly Exam     Pleasant 55 y.o. , female   postmenopausal here for annual exam.   GYN hx includes:  2 vaginal deliveries, bilateral breast cysts  Family Hx: MGA with BC , Cousin- Breast cancer   She reports no new changes in her health. Medically stable  She is unhappy with her weight, does not sleep well at night , she has been offered menopausal referral last year declined, still declines today      Reports history of abnormal pap smears.  Distant past,  repeated every 6-month for a period of time then resumed routine screening.  Patient does not recall having a colposcopy  Denies abnormal Mammograms   Denies postmenopausal bleeding   Denies issues with vasomotor symptoms   Denies pelvic pain   Denies vaginal issues.   Denies symptoms of urinary, or fecal incontinence.  Last year she mentioned leakage, she is doing her Kegel's with much improvement   Is sexually active. Monogamous relationship.   Denies dyspareunia   Denies STI concerns. 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    Review of Systems     Constitutional: Negative for chills, fatigue, fever, headaches, visual disturbances, and unexpected weight change.   Respiratory: Negative for cough, & shortness of breath.  Cardiovascular: Negative for chest pain. .    Gastrointestinal: Negative for Abd pain, nausea & vomiting, constipation and diarrhea.   Genitourinary: Negative for difficulty urinating, dysuria, hematuria, , unusual vaginal bleeding or discharge.   Skin: Negative skin changes    Physical Exam     Constitutional: Alert & Oriented x3, well-developed and well-nourished. No distress.   HENT: Atraumatic, Normocephalic, Conjunctivae clear  Neck: Normal range of motion.   Pulmonary: Effort normal.   Abdominal: Soft. No tenderness or masses  Musculoskeletal: Normal ROM  Skin: Warm & Dry  Psychological: Normal mood, thought content, behavior & judgement     Breasts:   Right: tissue soft without masses, tenderness, skin changes or  nipple discharge. No areas of erythema or pain. No subclavicular, axillary, pectoral adenopathy  Left:  tissue soft without masses, tenderness, skin changes or nipple discharge. No areas of erythema or pain. No subclavicular, axillary, pectoral adenopathy    Pelvic exam was performed with patient supine, lithotomy position.     Exam is consistent with  atrophic changes.  Vulva/ Vestibule: Right: Negative rash, tenderness, lesion or injury                               Left: Negative rash, tenderness, lesion or injury   Urethral meatus: Negative for  tenderness, inflammation or discharge.   Uterus: not deviated, enlarged, fixed or tender.   Cervix: No CMT, no discharge or friability.   Right adnexa: no mass, no tenderness and no fullness.  Left adnexa: no mass, no tenderness and no fullness.   Vagina: Consistent with  atrophic changes. No erythema, tenderness, masses, or foreign body in the vagina. No signs of injury around the vagina. No unusual vaginal discharge   Perineum without lesions, signs of injury, erythema or swelling.  Inguinal Canal:        Right: No inguinal adenopathy or hernia present.        Left: No inguinal adenopathy or hernia present.              [1]   Family History  Problem Relation Name Age of Onset    Stomach cancer Mother Meri     Asthma Mother Meri     Hypertension Father Pernell     Stroke Father Pernell     Diabetes Father Pernell     Hypertension Brother Javi     Diabetes Brother Javi     Hypertension Maternal Grandmother Ivon     Diabetes Maternal Grandmother Ivon     Hypertension Maternal Grandfather Papang     Hypertension Paternal Grandmother Mamang     Breast cancer Maternal Aunt Parvin     Drug abuse Maternal Uncle Benjo     Breast cancer Cousin Ladonna     Breast cancer additional onset Cousin Ladonna     Hepatitis Cousin Ladonna     Completed Suicide  Cousin Yuriy

## 2025-07-10 NOTE — PATIENT INSTRUCTIONS
" How to do a breast self-exam    We do not recommend that women perform breast self-exam. However, for women who feel comfortable examining their breasts and wish to continue doing this, proper technique is important. The best time to do a breast self-exam is a week after your period ends. If you no longer get monthly periods, you can do the exam any time.    Start by standing in front of a mirror. Place both hands at your sides. Check your breasts for changes in skin color or texture, and check for dents. Note how your nipples look. Some women have inverted nipples, meaning that their nipples point inward instead of out. This is normal as long as the way they look does not change over time.    Lift your hands over your head and turn to the side. Then look at each whole breast in the mirror. If you need to, lift each breast so you can see the skin under the breast.    Lie down and put your left hand above your head (this flattens the breast and makes it easier to examine). Use your right hand to examine your left breast, starting in the upper breast near the arm pit and going up and down across the breast (like mowing a lawn). Begin to examine the breast by making small circles with your three middle fingers. Use your finger pads at the end of your fingers but not the tips, and move your fingers in circles as if you were tracing the edge of a dime. At each spot on the breast, make three circles: one very light, one a bit firmer into the breast, and one deep in the breast. Then switch hands and do the same thing on the other breast.    It is normal to feel your ribs in your chest. Abnormal lumps tend to feel firm, have irregular edges, and sometimes feel like they are \"stuck\" to your chest. If you don't know whether a lump is normal or abnormal, see your doctor or nurse.    Graphic 37027 Version 10.0      Perineal Hygiene      Your vaginal naturally takes care of its self, it is a self washing system, the less you " mess the healthier it will be     Please do not use any anti bacterial soaps,  liquid soaps, body wash or feminine wash to the vulva, these products can cause dermitis, bacterial infections and other vulvar problems.   Your partner should avoid the same products as well to genital area.  Use only water to cleanse vulva, if you feel you need soap you may use a small amount of  Dove White Bar or Dove White Sensitive Skin Bar soap ( no liquid soap) if necessary.    Avoid the use of washcloths, exfoliating delmar's, netted scrubbers, or loofa's, use your hands only to cleanse the vulvar tissues    No scented lotions or products are advised in or near your vulva.    Use coconut oil in its solid form for moisture if needed.  No douching this may cause imbalance in your vaginal PH and further issues.    If you wear panty liners, you may apply a thin coating of Vaseline, A&D ointment or coconut oil to the vulvar tissues as a skin barrier     Wear Cotton underware, and loose fitting clothing  Use perfume-free, dye-free laundry detergent for under garments, use a second rinse cycle   Avoid fabric softeners/dryer sheets.         Over the counter probiotic to restore vaginal naga may be helpful as well, take daily.       You may also look into Boric Acid vaginal suppositories to restore vaginal PH balance for up to 2 weeks as directed on the box. You may not use these if you are pregnant      For vaginal dryness:      You may use:     Coconut oil in solid form, not liquid (organic, pure, unscented) as needed for moisture or lubrication. ( Do not use if allergic)       Replens moisture restore external comfort gel daily ( use as directed on the box)        Replens long lasting vaginal moisturizer  ( use as directed on the box)     May try Finisar vulvar moisturizer ( found on [employer] )    May try Revaree vaginal inserts        For Vaginal Lubrication:          You may use:     Coconut oil in solid form, not liquid (organic, pure,  unscented) as a lubricant or another scent-free lubricant (Astroglide, Uberlube) if needed.  Do not use coconut oil or silicone if using a condom as this may break down the integrity of the condom and cause an unplanned pregnancy              Do not use coconut oil if allergic               Replens silky smooth lubricant, premium silicone based lubricant for intercourse. ( use as directed, a small amount will provide an enhanced natural feeling)     Any premium over the counter vaginal lubricant water or silicone based. Silicone based will have more staying power and friction relief         For Vulvar irritation/itching:        You may use Hydrocortisone 1 % over the counter to external vulvar tissues 2 x daily for 5-7 days to help with irriation and itch relief.  Patient Education     Pelvic Floor Exercises   About this topic   The pelvic floor consists of muscles and strong bands of tissues which support all of the organs in your pelvis. Some of these organs are the bladder and the small and large bowel as well as the womb and the prostate. If the muscles and tissues get weak, your organs may drop. This can lead to other problems. Your urine may leak when you laugh, sneeze, or cough. You may not be able to drain the bladder fully. You may have less problems if you do exercises to strengthen your pelvic floor and abdominal muscles.  Kegel exercises help make the muscles in the pelvic floor stronger. Anyone can do them. It is also important to make your abdominal muscles stronger. In order for these exercises to work, you must be consistent when doing them.  General   Before starting with a program, ask your doctor if you are healthy enough to do these exercises. Your doctor may have you work with a  or physical therapist to make a safe exercise program to meet your needs.  Strengthening Exercises   Kegel exercises keep your pelvic muscles firm and strong. You can do these in many different positions. Start  by lying down with your knees bent and feet on the bed. Squeeze the pelvic muscles as if you are trying to stop the flow of urine. Hold these muscles for a count of 3, and then slowly relax them for a count of 3. Try to work up to squeezing for a count of 10 and slowly relaxing for a count of 10. Increase the muscle squeeze until you get to 10. When relaxing, slowly relax to a count of 10.  Breathe out when you are squeezing and breathe in when you are relaxing. Your goal is to try to do 10 Kegels 3 or more times each day. Take time to rest between sets. Be sure to only contract your pelvic floor muscles, not your buttocks, thighs, or abdominal muscles.  Pelvic floor contractions ? There are a few ways to feel the pelvic muscles contract.  When you are passing urine, try suddenly stopping your flow of urine. Do not do this on a regular basis, but only to feel what the contraction feels like. Doing this while passing urine can lead to other problems.  Put a finger into the vagina or rectum. Contract the muscles around your finger as if you were trying to stop the flow of urine or stop the passing of gas.  Place two chairs without arms about 2 inches (5 cm) apart. Sit so you have one butt cheek on each chair. Now, try yg your pelvic floor muscles. This will help you keep from using other muscles.  Pelvic tilts ? Lie on your back with your knees bent and feet flat on the floor. Tighten your stomach muscles and press your lower back down to the floor. Try doing Kegels with this exercise when your back is flattened. Hold 3 to 5 seconds. Relax.  Straight leg raises lying down ? Lie on your back with one leg straight. Bend your other knee so the foot is flat on the bed. Keeping your leg straight, lift the leg up to the level of your other knee. Lower it back down. Repeat with the other leg.  Hip lifts ? Lie on your back with your knees bent and feet flat on the floor. Tighten your stomach muscles and lift your  buttocks off the floor. Try doing Kegels when up in this position. Hold 3 to 5 seconds. Relax.  Abdominal bracing ? Do this exercise in different positions: Lying down, sitting, and standing. Tighten your stomach muscles. While keeping the stomach muscles tight, tighten your pelvic floor muscles. Now, forcefully laugh or cough to see if you were able to prevent urine from leaking.  Abdominal crunches ? Lie on your back with both knees bent. Keep your feet flat on the floor. Place your hands in one of these positions. Try starting with the first position since it is the easiest. As you get better, use the other positions to make it harder.  Crunches with arms at sides.  Crunches with arms across chest.  Crunches with arms behind head. Be careful not to interlock your fingers behind your neck or head while doing crunches. This may add tension to your neck and cause strain.  Look at the ceiling. Tighten your belly muscles and lift your shoulders and upper back off the floor. Breathe out while you are doing this. Lower your shoulders to the floor. Breathe in while you are doing this. Relax your belly muscles all the way before starting another crunch.             What will the results be?   Less leakage of urine when you cough, sneeze, laugh, or run  Fewer strong urges to pass urine  Fewer trips to the bathroom each day  Less risk of organs, such as the uterus or bladder, dropping into the vagina  Faster recovery after childbirth or prostate surgery  Stronger core muscles  Increased sensitivity during sex  Helpful tips   You can also try doing a different kind of Kegels. Do 5 quick, strong pelvic floor contractions. Sometimes, if you have an urge to pass urine but are not near a bathroom, you can do this kind of Kegel to calm the urge.  Stay active and work out to keep your muscles strong and flexible.  Keep a healthy weight to avoid putting too much stress on your spine. Eat a healthy diet to keep your muscles  healthy.  Be sure you do not hold your breath when exercising. This can raise your blood pressure. If you tend to hold your breath, try counting out loud when exercising. If any exercise bothers you, stop right away.  Try walking or cycling at an easy pace for a few minutes to warm up your muscles. Do this again after exercising.  Doing exercises before a meal may be a good way to get into a routine. A good time to do these exercises is each time you are stopped at a stop light while driving.  Exercise may be slightly uncomfortable, but you should not have sharp pains. If you do get sharp pains, stop what you are doing. If the sharp pains continue, call your doctor.  Last Reviewed Date   2021-03-31  Consumer Information Use and Disclaimer   This generalized information is a limited summary of diagnosis, treatment, and/or medication information. It is not meant to be comprehensive and should be used as a tool to help the user understand and/or assess potential diagnostic and treatment options. It does NOT include all information about conditions, treatments, medications, side effects, or risks that may apply to a specific patient. It is not intended to be medical advice or a substitute for the medical advice, diagnosis, or treatment of a health care provider based on the health care provider's examination and assessment of a patient’s specific and unique circumstances. Patients must speak with a health care provider for complete information about their health, medical questions, and treatment options, including any risks or benefits regarding use of medications. This information does not endorse any treatments or medications as safe, effective, or approved for treating a specific patient. UpToDate, Inc. and its affiliates disclaim any warranty or liability relating to this information or the use thereof. The use of this information is governed by the Terms of Use, available at  "https://www.[employer].com/en/know/clinical-effectiveness-terms   Copyright   Copyright © 2024 UpToDate, Inc. and its affiliates and/or licensors. All rights reserved.  Patient Education     Menopause   The Basics   Written by the doctors and editors at [facility]   What is menopause? -- Menopause is the time in life when monthly periods naturally stop. At this time, the ovaries stop releasing eggs and stop making the hormones estrogen and progesterone.  Menopause usually occurs between the ages of 45 and 55. The average age is 51.  Menopause does not happen all at once. It is a \"transition\" that takes years. It can cause symptoms that are difficult for a lot of people. But there are treatments that can help.  How do I know if I am going through menopause? -- You might wonder about menopause when your periods start to change. If you are going through menopause, you might:   Have periods more or less often than usual (for example, every 5 to 6 weeks instead of every 4)   Have shorter periods than before   Skip 1 or more periods   Have symptoms like hot flashes  If you have had a hysterectomy (surgery to remove your uterus), but you still have your ovaries, it might be tough to tell when you are going through menopause. Still, you can have menopause symptoms even if you no longer have a uterus.  If your ovaries were removed before the usual age of menopause, you had what doctors call \"surgical menopause.\" That just means that you went through it early, because your ovaries were removed.  What are the symptoms of menopause? -- Some people go through menopause without symptoms. But most have 1 or more of these symptoms:   Hot flashes - Hot flashes feel like a wave of heat that starts in your chest and face and then moves through your body. Hot flashes usually start happening before you stop having periods.   Night sweats - When hot flashes happen during sleep, they are called \"night sweats.\" They can make it hard to " get a good night's sleep.   Sleep problems - During the transition to menopause, some people have trouble falling or staying asleep. This can happen even if night sweats are not a problem.   Vaginal dryness - Menopause can cause the vagina and tissues near the vagina to become dry and thin. This usually starts a few years after menopause. It can be uncomfortable or make sex painful.   Depression - During the transition to menopause, many people start having symptoms of depression or anxiety. This is more likely if you have had depression before. Depression symptoms include:   Sadness   Losing interest in doing things   Sleeping too much or too little   Trouble concentrating or remembering things - This might be caused by lack of sleep that often happens at menopause, or by the lack of estrogen. Some experts suspect that estrogen is important for good brain function.   Headaches - If you get migraine headaches related to your period, these might get worse during menopause.   Joint pain - Some people have joint aches or pains during and after menopause.  Many of these symptoms get better after menopause. But some people continue to have hot flashes, even for years afterwards.  Should I see a doctor or nurse? -- It depends. If your periods start changing and you are 45 or older, you do not need to see your doctor for this reason alone. But you should tell them if you have symptoms that bother you.  For example, see your doctor if you cannot sleep because of night sweats, if it is hard to work because of your hot flashes, or if you feel sad or down and don't seem to enjoy things anymore. If your regular doctor cannot recommend treatments that can help, you might consider looking for a doctor who is a specialist in menopause or women's health. They might have more information about ways to relieve symptoms.  You should also see a doctor or nurse if you:   Have your period more often than every 3 weeks   Have very heavy  "bleeding during your period   Have bleeding or \"spotting\" between periods   Have been through menopause (have gone 12 months without a period) and start bleeding again, even if it's just a spot of blood  Is there a test for menopause? -- There is a test that can help tell if you are going through menopause. But doctors usually use this only in people who are too young to be in menopause or who have special circumstances.  Can I still get pregnant? -- As long as you are still having periods, even if they do not happen often, it is possible to get pregnant. If you have sex and do not want to get pregnant, use some form of birth control.  If you have not had a period for a full year, it is probably safe to say you have been through menopause and can no longer get pregnant.  How are the symptoms of menopause treated? -- There are treatments that can help relieve symptoms.  Treatments for hot flashes include:   Hormone therapy - The hormone estrogen is the most effective treatment for menopause symptoms. Most people need to take estrogen with another hormone, called progesterone. People who have had a hysterectomy (surgery to remove the uterus) can take estrogen by itself. Experts think that these hormones are effective and safe for most people.  If you want to take hormones, ask your doctor or nurse if it is an option for you. You should not take hormones if you have had breast cancer, a heart attack, a stroke, or a blood clot.   Antidepressants - Some types of antidepressants can ease hot flashes and depression. Even if you do not have depression, these medicines can still help with hot flashes. They are often used by people who have had breast cancer and cannot take estrogen.   Anti-seizure medicine - One of the medicines used to prevent seizures seems to help some people with hot flashes, even if they do not have seizures.  Treatments for vaginal dryness include:   Vaginal estrogen - Vaginal estrogen is any form of " "estrogen that goes directly into the vagina. It comes in creams, tablets, or a flexible ring. Vaginal estrogen comes in small doses that don't increase the levels of estrogen in other parts of the body very much.   Other medicines - In most cases, doctors recommend vaginal estrogen. That's because there is more evidence that it helps with vaginal dryness compared with other medicines. But if you do not want to use vaginal estrogen, your doctor can suggest other options. For example:   You might choose to use a vaginal moisturizer several times a week. Vaginal moisturizers (sample brand names: Replens, K-Y SILK-E) do not contain hormones. They help keep the vagina moist all of the time. You can also add a lubricant (sample brand names: Astroglide, K-Y Jelly) when you have sex.   In some cases, doctors might suggest another medicine. For example, there is a tablet that you insert into the vagina once a day. There is also a pill that might help some people who cannot use vaginal products.  Some doctors are not used to prescribing hormone therapy for menopause. If you feel that you are not getting the help you need, you might choose to talk to a different doctor who is an expert in menopause treatment. You can ask your doctor or nurse to refer you to someone.  Can I do anything on my own to reduce the symptoms of menopause? -- Yes. There are some steps you can try (table 1). But ask your doctor before you take any \"natural remedies,\" especially if you have a history of breast cancer. Things like herbs and supplements are not proven to help, and in some cases, could harm you.  What can I do to protect my bones? -- You can:   Take calcium and vitamin D supplements.   Be active (physical activity helps keep bones strong).   Ask your doctor when you should start having bone density tests.  If needed, your doctor can prescribe medicines to help keep your bones strong.  All topics are updated as new evidence becomes available " and our peer review process is complete.  This topic retrieved from [employer] on: Feb 26, 2024.  Topic 88253 Version 11.0  Release: 32.2.4 - C32.56  © 2024 UpToDate, Inc. and/or its affiliates. All rights reserved.  table 1: Ways to cope with menopause symptoms  Symptom  What you can do    Hot flashes and night sweats Dress in layers so you can take off clothes if you get hot.    Keep your home at a comfortable temperature. Avoid hot drinks, such as coffee and tea.    Put a cold, wet washcloth against your neck during hot flashes.    Quit smoking, if you smoke. (Smoking makes hot flashes worse.) If you are having trouble quitting, your doctor or nurse can help.   Vaginal dryness Use a vaginal moisturizer a few times a week (sample brand names: Replens, K-Y SILK-E).    Use a lubricant before sex (sample brand names: Astroglide, K-Y Jelly).   Sleep problems Try to go to sleep and get up at the same time every day, even when you don't sleep well.    Avoid caffeine in the afternoon, and limit alcohol.   Depression Try to stay active. Exercise can help your mood.    Seek support from other people going through menopause.   Graphic 95641 Version 4.0  Consumer Information Use and Disclaimer   Disclaimer: This generalized information is a limited summary of diagnosis, treatment, and/or medication information. It is not meant to be comprehensive and should be used as a tool to help the user understand and/or assess potential diagnostic and treatment options. It does NOT include all information about conditions, treatments, medications, side effects, or risks that may apply to a specific patient. It is not intended to be medical advice or a substitute for the medical advice, diagnosis, or treatment of a health care provider based on the health care provider's examination and assessment of a patient's specific and unique circumstances. Patients must speak with a health care provider for complete information about their health,  medical questions, and treatment options, including any risks or benefits regarding use of medications. This information does not endorse any treatments or medications as safe, effective, or approved for treating a specific patient. UpToDate, Inc. and its affiliates disclaim any warranty or liability relating to this information or the use thereof.The use of this information is governed by the Terms of Use, available at https://www.[employer]com/en/know/clinical-effectiveness-terms. 2024© UpToDate, Inc. and its affiliates and/or licensors. All rights reserved.  Copyright   © 2024 UpToDate, Inc. and/or its affiliates. All rights reserved.

## 2025-07-11 ENCOUNTER — PREP FOR PROCEDURE (OUTPATIENT)
Dept: INTERVENTIONAL RADIOLOGY/VASCULAR | Facility: CLINIC | Age: 55
End: 2025-07-11

## 2025-07-11 ENCOUNTER — OFFICE VISIT (OUTPATIENT)
Dept: NEPHROLOGY | Facility: CLINIC | Age: 55
End: 2025-07-11
Payer: COMMERCIAL

## 2025-07-11 ENCOUNTER — TELEPHONE (OUTPATIENT)
Dept: RADIOLOGY | Facility: HOSPITAL | Age: 55
End: 2025-07-11

## 2025-07-11 ENCOUNTER — ANNUAL EXAM (OUTPATIENT)
Dept: OBGYN CLINIC | Facility: CLINIC | Age: 55
End: 2025-07-11
Payer: COMMERCIAL

## 2025-07-11 VITALS
TEMPERATURE: 97.9 F | SYSTOLIC BLOOD PRESSURE: 121 MMHG | WEIGHT: 135 LBS | BODY MASS INDEX: 26.5 KG/M2 | HEART RATE: 72 BPM | HEIGHT: 60 IN | OXYGEN SATURATION: 98 % | DIASTOLIC BLOOD PRESSURE: 76 MMHG

## 2025-07-11 VITALS
SYSTOLIC BLOOD PRESSURE: 130 MMHG | HEIGHT: 60 IN | WEIGHT: 137 LBS | DIASTOLIC BLOOD PRESSURE: 78 MMHG | BODY MASS INDEX: 26.9 KG/M2

## 2025-07-11 DIAGNOSIS — R80.9 PROTEINURIA, UNSPECIFIED TYPE: Primary | ICD-10-CM

## 2025-07-11 DIAGNOSIS — I10 ESSENTIAL HYPERTENSION: ICD-10-CM

## 2025-07-11 DIAGNOSIS — Z01.419 ENCOUNTER FOR GYNECOLOGICAL EXAMINATION WITHOUT ABNORMAL FINDING: Primary | ICD-10-CM

## 2025-07-11 DIAGNOSIS — R80.1 PERSISTENT PROTEINURIA: Primary | ICD-10-CM

## 2025-07-11 DIAGNOSIS — Z12.31 ENCOUNTER FOR SCREENING MAMMOGRAM FOR MALIGNANT NEOPLASM OF BREAST: ICD-10-CM

## 2025-07-11 PROCEDURE — 99214 OFFICE O/P EST MOD 30 MIN: CPT | Performed by: INTERNAL MEDICINE

## 2025-07-11 PROCEDURE — S0612 ANNUAL GYNECOLOGICAL EXAMINA: HCPCS | Performed by: OBSTETRICS & GYNECOLOGY

## 2025-07-11 RX ORDER — DIPHENOXYLATE HYDROCHLORIDE AND ATROPINE SULFATE 2.5; .025 MG/1; MG/1
1 TABLET ORAL DAILY
COMMUNITY

## 2025-07-11 NOTE — ASSESSMENT & PLAN NOTE
Orders:    Albumin / creatinine urine ratio; Future    Ambulatory Referral to Interventional Radiology; Future    Albumin; Standing    Albumin / creatinine urine ratio; Standing    Calcium; Standing    CBC and differential; Standing    Comprehensive metabolic panel; Standing    Phosphorus; Standing    Urinalysis with microscopic; Standing    Longstanding history of proteinuria.  Most recent urinalysis revealing 3+ protein and small blood.  Serological workup has been negative thus far.  Patient is agreeable to undergo renal biopsy.  Will attempt to perform renal biopsy toward the end of August.  Recommended avoidance of aspirin or any other blood thinners 7 days prior to biopsy.  Maintenance of adequate blood pressure message prevent postbiopsy bleeding.  In the interim patient will continue taking Lotrel and spironolactone.   no

## 2025-07-11 NOTE — PROGRESS NOTES
Name: Bing Levy      : 1970      MRN: [id number]  Encounter Provider: Lary Ayoub MD  Encounter Date: 2025   Encounter department: [facility]  :  Assessment & Plan  Persistent proteinuria    Orders:    Albumin / creatinine urine ratio; Future    Ambulatory Referral to Interventional Radiology; Future    Albumin; Standing    Albumin / creatinine urine ratio; Standing    Calcium; Standing    CBC and differential; Standing    Comprehensive metabolic panel; Standing    Phosphorus; Standing    Urinalysis with microscopic; Standing    Longstanding history of proteinuria.  Most recent urinalysis revealing 3+ protein and small blood.  Serological workup has been negative thus far.  Patient is agreeable to undergo renal biopsy.  Will attempt to perform renal biopsy toward the end of August.  Recommended avoidance of aspirin or any other blood thinners 7 days prior to biopsy.  Maintenance of adequate blood pressure message prevent postbiopsy bleeding.  In the interim patient will continue taking Lotrel and spironolactone.  Essential hypertension  Blood pressure is well within acceptable range while on Lotrel and spironolactone           History of Present Illness   HPI  Bing Levy is a 55 y.o. female who presents to renal office for management of proteinuria.  Patient returned from [facility].  Admits to be taking Lotrel as well as spironolactone.  States that she has been unable to lose weight.  History obtained from: patient    Review of Systems   Constitutional: Negative.    HENT: Negative.     Eyes: Negative.    Respiratory: Negative.     Cardiovascular: Negative.    Gastrointestinal: Negative.    Endocrine: Negative.    Genitourinary: Negative.    Musculoskeletal: Negative.    Skin: Negative.    Allergic/Immunologic: Negative.    Neurological: Negative.    Hematological: Negative.    All other systems reviewed and are negative.    Medical History Reviewed by provider  this encounter:     .     Objective   /76 (BP Location: Left arm, Patient Position: Sitting, Cuff Size: Standard)   Pulse 72   Temp 97.9 °F (36.6 °C) (Tympanic)   Ht 5' (1.524 m)   Wt 61.2 kg (135 lb)   LMP 10/12/2022   SpO2 98%   BMI 26.37 kg/m²      Physical Exam  Constitutional:       Appearance: She is well-developed.   HENT:      Head: Normocephalic and atraumatic.     Eyes:      Pupils: Pupils are equal, round, and reactive to light.       Cardiovascular:      Rate and Rhythm: Normal rate and regular rhythm.      Heart sounds: Normal heart sounds.   Pulmonary:      Effort: Pulmonary effort is normal.   Abdominal:      General: Bowel sounds are normal.      Palpations: Abdomen is soft.     Musculoskeletal:         General: Normal range of motion.      Cervical back: Neck supple.     Skin:     General: Skin is warm.     Neurological:      Mental Status: She is alert and oriented to person, place, and time.         Administrative Statements   I have spent a total time of 35 minutes in caring for this patient on the day of the visit/encounter including Diagnostic results, Prognosis, Risks and benefits of tx options, Instructions for management, Importance of tx compliance, Risk factor reductions, Impressions, Counseling / Coordination of care, and Documenting in the medical record.

## 2025-08-19 ENCOUNTER — HOSPITAL ENCOUNTER (OUTPATIENT)
Dept: CT IMAGING | Facility: HOSPITAL | Age: 55
Discharge: HOME | End: 2025-08-19
Attending: RADIOLOGY

## 2025-08-21 ENCOUNTER — TELEPHONE (OUTPATIENT)
Age: 55
End: 2025-08-21